# Patient Record
Sex: MALE | Race: OTHER | Employment: UNEMPLOYED | URBAN - METROPOLITAN AREA
[De-identification: names, ages, dates, MRNs, and addresses within clinical notes are randomized per-mention and may not be internally consistent; named-entity substitution may affect disease eponyms.]

---

## 2020-09-16 ENCOUNTER — LAB ENCOUNTER (OUTPATIENT)
Dept: LAB | Age: 55
End: 2020-09-16
Attending: FAMILY MEDICINE
Payer: MEDICAID

## 2020-09-16 ENCOUNTER — OFFICE VISIT (OUTPATIENT)
Dept: FAMILY MEDICINE CLINIC | Facility: CLINIC | Age: 55
End: 2020-09-16
Payer: MEDICAID

## 2020-09-16 VITALS
SYSTOLIC BLOOD PRESSURE: 166 MMHG | BODY MASS INDEX: 26.63 KG/M2 | DIASTOLIC BLOOD PRESSURE: 82 MMHG | HEART RATE: 66 BPM | HEIGHT: 71 IN | TEMPERATURE: 98 F | WEIGHT: 190.19 LBS

## 2020-09-16 DIAGNOSIS — E78.00 PURE HYPERCHOLESTEROLEMIA: ICD-10-CM

## 2020-09-16 DIAGNOSIS — I10 ESSENTIAL HYPERTENSION: ICD-10-CM

## 2020-09-16 DIAGNOSIS — E11.65 UNCONTROLLED TYPE 2 DIABETES MELLITUS WITH HYPERGLYCEMIA (HCC): ICD-10-CM

## 2020-09-16 DIAGNOSIS — E11.65 UNCONTROLLED TYPE 2 DIABETES MELLITUS WITH HYPERGLYCEMIA (HCC): Primary | ICD-10-CM

## 2020-09-16 LAB
ALBUMIN SERPL-MCNC: 3.6 G/DL (ref 3.4–5)
ALBUMIN/GLOB SERPL: 1 {RATIO} (ref 1–2)
ALP LIVER SERPL-CCNC: 87 U/L (ref 45–117)
ALT SERPL-CCNC: 22 U/L (ref 16–61)
ANION GAP SERPL CALC-SCNC: 5 MMOL/L (ref 0–18)
AST SERPL-CCNC: 18 U/L (ref 15–37)
BILIRUB SERPL-MCNC: 0.7 MG/DL (ref 0.1–2)
BUN BLD-MCNC: 16 MG/DL (ref 7–18)
BUN/CREAT SERPL: 16.2 (ref 10–20)
CALCIUM BLD-MCNC: 9.8 MG/DL (ref 8.5–10.1)
CHLORIDE SERPL-SCNC: 105 MMOL/L (ref 98–112)
CHOLEST SMN-MCNC: 190 MG/DL (ref ?–200)
CO2 SERPL-SCNC: 30 MMOL/L (ref 21–32)
CREAT BLD-MCNC: 0.99 MG/DL (ref 0.7–1.3)
CREAT UR-SCNC: 230 MG/DL
EST. AVERAGE GLUCOSE BLD GHB EST-MCNC: 206 MG/DL (ref 68–126)
GLOBULIN PLAS-MCNC: 3.7 G/DL (ref 2.8–4.4)
GLUCOSE BLD-MCNC: 189 MG/DL (ref 70–99)
HBA1C MFR BLD HPLC: 8.8 % (ref ?–5.7)
HDLC SERPL-MCNC: 41 MG/DL (ref 40–59)
LDLC SERPL CALC-MCNC: 102 MG/DL (ref ?–100)
M PROTEIN MFR SERPL ELPH: 7.3 G/DL (ref 6.4–8.2)
MICROALBUMIN UR-MCNC: 77.8 MG/DL
MICROALBUMIN/CREAT 24H UR-RTO: 338.3 UG/MG (ref ?–30)
NONHDLC SERPL-MCNC: 149 MG/DL (ref ?–130)
OSMOLALITY SERPL CALC.SUM OF ELEC: 296 MOSM/KG (ref 275–295)
PATIENT FASTING Y/N/NP: YES
PATIENT FASTING Y/N/NP: YES
POTASSIUM SERPL-SCNC: 4.6 MMOL/L (ref 3.5–5.1)
SODIUM SERPL-SCNC: 140 MMOL/L (ref 136–145)
TRIGL SERPL-MCNC: 235 MG/DL (ref 30–149)
VLDLC SERPL CALC-MCNC: 47 MG/DL (ref 0–30)

## 2020-09-16 PROCEDURE — 83036 HEMOGLOBIN GLYCOSYLATED A1C: CPT

## 2020-09-16 PROCEDURE — 80061 LIPID PANEL: CPT

## 2020-09-16 PROCEDURE — 90471 IMMUNIZATION ADMIN: CPT | Performed by: FAMILY MEDICINE

## 2020-09-16 PROCEDURE — 3008F BODY MASS INDEX DOCD: CPT | Performed by: FAMILY MEDICINE

## 2020-09-16 PROCEDURE — 90686 IIV4 VACC NO PRSV 0.5 ML IM: CPT | Performed by: FAMILY MEDICINE

## 2020-09-16 PROCEDURE — 3079F DIAST BP 80-89 MM HG: CPT | Performed by: FAMILY MEDICINE

## 2020-09-16 PROCEDURE — 3077F SYST BP >= 140 MM HG: CPT | Performed by: FAMILY MEDICINE

## 2020-09-16 PROCEDURE — 99204 OFFICE O/P NEW MOD 45 MIN: CPT | Performed by: FAMILY MEDICINE

## 2020-09-16 PROCEDURE — 80053 COMPREHEN METABOLIC PANEL: CPT

## 2020-09-16 PROCEDURE — 36415 COLL VENOUS BLD VENIPUNCTURE: CPT

## 2020-09-16 PROCEDURE — 82570 ASSAY OF URINE CREATININE: CPT

## 2020-09-16 PROCEDURE — 82043 UR ALBUMIN QUANTITATIVE: CPT

## 2020-09-16 RX ORDER — ATORVASTATIN CALCIUM 40 MG/1
40 TABLET, FILM COATED ORAL DAILY
Qty: 90 TABLET | Refills: 1 | Status: SHIPPED | OUTPATIENT
Start: 2020-09-16 | End: 2020-12-05

## 2020-09-16 RX ORDER — PIOGLITAZONEHYDROCHLORIDE 30 MG/1
30 TABLET ORAL DAILY
Qty: 90 TABLET | Refills: 1 | Status: SHIPPED | OUTPATIENT
Start: 2020-09-16 | End: 2020-12-05

## 2020-09-16 RX ORDER — ERGOCALCIFEROL 1.25 MG/1
CAPSULE ORAL
COMMUNITY
Start: 2018-07-13 | End: 2021-03-08

## 2020-09-16 RX ORDER — LISINOPRIL AND HYDROCHLOROTHIAZIDE 25; 20 MG/1; MG/1
TABLET ORAL
Qty: 90 TABLET | Refills: 1 | Status: SHIPPED | OUTPATIENT
Start: 2020-09-16 | End: 2020-10-27

## 2020-09-16 RX ORDER — LISINOPRIL AND HYDROCHLOROTHIAZIDE 25; 20 MG/1; MG/1
TABLET ORAL
COMMUNITY
Start: 2020-06-01 | End: 2020-09-16

## 2020-09-16 RX ORDER — PIOGLITAZONEHYDROCHLORIDE 30 MG/1
30 TABLET ORAL DAILY
COMMUNITY
Start: 2020-06-01 | End: 2020-09-16

## 2020-09-16 RX ORDER — GLYBURIDE-METFORMIN HYDROCHLORIDE 5; 500 MG/1; MG/1
2 TABLET ORAL 2 TIMES DAILY
Qty: 90 TABLET | Refills: 1 | Status: SHIPPED | OUTPATIENT
Start: 2020-09-16 | End: 2020-09-21

## 2020-09-16 RX ORDER — ATORVASTATIN CALCIUM 40 MG/1
40 TABLET, FILM COATED ORAL DAILY
COMMUNITY
Start: 2020-06-01 | End: 2020-09-16

## 2020-09-16 RX ORDER — LINAGLIPTIN 5 MG/1
5 TABLET, FILM COATED ORAL DAILY
COMMUNITY
Start: 2020-06-07 | End: 2020-09-16

## 2020-09-16 RX ORDER — GLYBURIDE-METFORMIN HYDROCHLORIDE 5; 500 MG/1; MG/1
2 TABLET ORAL 2 TIMES DAILY
COMMUNITY
Start: 2020-06-01 | End: 2020-09-16

## 2020-09-16 NOTE — PROGRESS NOTES
9/16/2020  10:29 AM    Dexter Nicole is a 63year old male. Chief complaint(s): Patient presents with:  Diabetes  HTN  Hyperlipidemia    HPI:     Dexter Nicole primary complaint is regarding as above.      Patient Dexter Nicole is a 63 (medication) and a low cholesterol/low fat diet. Compliance with treatment has been fair. he denies experiencing any hypercholesterolemia related symptoms. Average lipid levels with medication run slightly Low per patient report.   Most recent lab tests Constitutional: Negative for chills, fatigue and fever. Eyes: Negative for visual disturbance. Respiratory: Negative for shortness of breath and wheezing. Cardiovascular: Negative for chest pain, palpitations and leg swelling.    Endocrine: Lacy Ocean glyBURIDE-metFORMIN 5-500 MG Oral Tab, Take 2 tablets by mouth 2 (two) times daily. , Disp: 90 tablet, Rfl: 1  •  Canagliflozin (INVOKANA) 100 MG Oral Tab, Invokana 100 mg tablet  TAKE 1 TABLET BY MOUTH EVERY DAY, Disp: 90 tablet, Rfl: 1  •  atorvastatin 40 target of <7% according to ADA and <6.5% according to AACE were discussed.         2. Essential hypertension   HTN     MEDICATIONS:   Requested Prescriptions     Signed Prescriptions Disp Refills   • Pioglitazone HCl 30 MG Oral Tab 90 tablet 1     Sig: Take tablet 1     Sig: Invokana 100 mg tablet   TAKE 1 TABLET BY MOUTH EVERY DAY   • atorvastatin 40 MG Oral Tab 90 tablet 1     Sig: Take 1 tablet (40 mg total) by mouth daily.       LABORATORY & ORDERS: Orders Placed This Encounter      ** Hemoglobin A1C  [E] Eliana Mcdaniel MD

## 2020-09-21 ENCOUNTER — TELEPHONE (OUTPATIENT)
Dept: FAMILY MEDICINE CLINIC | Facility: CLINIC | Age: 55
End: 2020-09-21

## 2020-09-21 RX ORDER — GLYBURIDE-METFORMIN HYDROCHLORIDE 5; 500 MG/1; MG/1
2 TABLET ORAL 2 TIMES DAILY
Qty: 360 TABLET | Refills: 1 | Status: SHIPPED | OUTPATIENT
Start: 2020-09-21 | End: 2020-10-27

## 2020-09-21 NOTE — TELEPHONE ENCOUNTER
Patient is requesting a new script for a 90 day supply of lisinopril-hydrochlorothiazide. Please advise. Lisinopril-hydroCHLOROthiazide 20-25 MG Oral Tab 90 tablet 1 9/16/2020    Sig:   TOME DOS TABLETAS TODOS LOS D AS PARA LA PRESION.      Route:   (

## 2020-09-21 NOTE — TELEPHONE ENCOUNTER
Pharmacy  calling stating  patient requesting 90 day supply of glyBURIDE-metFORMIN 5-500 MG Oral Tab    Dr. Carlitos Parikh:   Please see pended script and sign

## 2020-10-26 NOTE — TELEPHONE ENCOUNTER
Patient stated he needs a refill on      Lisinopril-hydroCHLOROthiazide 20-25 MG Oral Tab 90 tablet 1 9/16/2020    Sig:   TOME DOS BOBBI MAYS LOS D AS PARA LA PRESION.        glyBURIDE-metFORMIN 5-500 MG Oral Tab 360 tablet 1 9/21/2020     Sig - Route:

## 2020-10-27 RX ORDER — GLYBURIDE-METFORMIN HYDROCHLORIDE 5; 500 MG/1; MG/1
2 TABLET ORAL 2 TIMES DAILY
Qty: 360 TABLET | Refills: 1 | Status: SHIPPED | OUTPATIENT
Start: 2020-10-27 | End: 2020-12-05

## 2020-10-27 RX ORDER — LISINOPRIL AND HYDROCHLOROTHIAZIDE 25; 20 MG/1; MG/1
TABLET ORAL
Qty: 90 TABLET | Refills: 1 | Status: SHIPPED | OUTPATIENT
Start: 2020-10-27 | End: 2020-12-05

## 2020-12-05 ENCOUNTER — OFFICE VISIT (OUTPATIENT)
Dept: FAMILY MEDICINE CLINIC | Facility: CLINIC | Age: 55
End: 2020-12-05
Payer: MEDICAID

## 2020-12-05 VITALS
SYSTOLIC BLOOD PRESSURE: 162 MMHG | WEIGHT: 186.13 LBS | RESPIRATION RATE: 18 BRPM | BODY MASS INDEX: 26.06 KG/M2 | HEIGHT: 71 IN | DIASTOLIC BLOOD PRESSURE: 90 MMHG | HEART RATE: 84 BPM

## 2020-12-05 DIAGNOSIS — E11.65 UNCONTROLLED TYPE 2 DIABETES MELLITUS WITH HYPERGLYCEMIA (HCC): Primary | ICD-10-CM

## 2020-12-05 DIAGNOSIS — I10 ESSENTIAL HYPERTENSION: ICD-10-CM

## 2020-12-05 PROCEDURE — 3077F SYST BP >= 140 MM HG: CPT | Performed by: FAMILY MEDICINE

## 2020-12-05 PROCEDURE — 3008F BODY MASS INDEX DOCD: CPT | Performed by: FAMILY MEDICINE

## 2020-12-05 PROCEDURE — 82962 GLUCOSE BLOOD TEST: CPT | Performed by: FAMILY MEDICINE

## 2020-12-05 PROCEDURE — 3080F DIAST BP >= 90 MM HG: CPT | Performed by: FAMILY MEDICINE

## 2020-12-05 PROCEDURE — 36416 COLLJ CAPILLARY BLOOD SPEC: CPT | Performed by: FAMILY MEDICINE

## 2020-12-05 PROCEDURE — 99213 OFFICE O/P EST LOW 20 MIN: CPT | Performed by: FAMILY MEDICINE

## 2020-12-05 RX ORDER — GLYBURIDE-METFORMIN HYDROCHLORIDE 5; 500 MG/1; MG/1
2 TABLET ORAL 2 TIMES DAILY
Qty: 360 TABLET | Refills: 2 | Status: SHIPPED | OUTPATIENT
Start: 2020-12-05 | End: 2021-06-21

## 2020-12-05 RX ORDER — CANAGLIFLOZIN 100 MG/1
TABLET, FILM COATED ORAL
Qty: 90 TABLET | Refills: 2 | Status: SHIPPED | OUTPATIENT
Start: 2020-12-05 | End: 2021-06-21

## 2020-12-05 RX ORDER — ATORVASTATIN CALCIUM 40 MG/1
40 TABLET, FILM COATED ORAL DAILY
Qty: 90 TABLET | Refills: 2 | Status: SHIPPED | OUTPATIENT
Start: 2020-12-05 | End: 2021-06-21

## 2020-12-05 RX ORDER — LISINOPRIL AND HYDROCHLOROTHIAZIDE 25; 20 MG/1; MG/1
TABLET ORAL
Qty: 180 TABLET | Refills: 2 | Status: SHIPPED | OUTPATIENT
Start: 2020-12-05 | End: 2021-11-23

## 2020-12-05 RX ORDER — PIOGLITAZONEHYDROCHLORIDE 30 MG/1
30 TABLET ORAL DAILY
Qty: 90 TABLET | Refills: 2 | Status: SHIPPED | OUTPATIENT
Start: 2020-12-05 | End: 2021-06-21

## 2020-12-05 NOTE — PROGRESS NOTES
12/5/2020  11:58 AM    Ayanna Ambar is a 63year old male. Chief complaint(s): Patient presents with: Follow - Up: Present for diabetic and bp check. Medication Request: Requesting refills on medications.    HTN  HPI:     Ayanna Villalta p Diabetes [facility])    • Essential hypertension    • Hyperlipidemia       History reviewed. No pertinent surgical history.    Family History   Problem Relation Age of Onset   • Diabetes Father    • Cancer Father 76        Bladder cancer   • Diabetes Mother    • Allergies      ROS:   Review of Systems   Constitutional: Negative for chills, fatigue and fever. Respiratory: Negative for shortness of breath and wheezing. Cardiovascular: Negative for chest pain and palpitations.    Endocrine: Negative for polydipsi 30 MG Oral Tab, Take 1 tablet (30 mg total) by mouth daily. , Disp: 90 tablet, Rfl: 2  •  Lisinopril-hydroCHLOROthiazide 20-25 MG Oral Tab, TOME DOS TABLETAS TODOS LOS D AS PARA LA PRESION., Disp: 180 tablet, Rfl: 2  •  ergocalciferol 1.25 MG (34358 UT) [facility] Visit:  Orders Placed This Encounter      POC Finger stick glucose [[id number]]      Meds This Visit:  Requested Prescriptions     Signed Prescriptions Disp Refills   • atorvastatin 40 MG Oral Tab 90 tablet 2     Sig: Take 1 tablet (40 mg total) by mouth daily.

## 2020-12-15 ENCOUNTER — TELEPHONE (OUTPATIENT)
Dept: FAMILY MEDICINE CLINIC | Facility: CLINIC | Age: 55
End: 2020-12-15

## 2020-12-15 DIAGNOSIS — E11.65 UNCONTROLLED TYPE 2 DIABETES MELLITUS WITH HYPERGLYCEMIA (HCC): Primary | ICD-10-CM

## 2020-12-15 NOTE — TELEPHONE ENCOUNTER
Please see pharmacy's message below and advise on quantity of test strips and add'l refills--only frequency of testing entered on 12/05/2020 DME order          Order Questions    Question Answer Comment   Frequency of testing 2 times daily    Dispensable s

## 2020-12-15 NOTE — TELEPHONE ENCOUNTER
Denice from [facility] calling need to know the quantity for the diabetic strips and how many refills she states the order came over December 5      Please advise   [phone]

## 2020-12-16 NOTE — TELEPHONE ENCOUNTER
Dr. Chance Verma also requesting rx for lancets and meter. Please advise. Thanks. See pending order. Please review and advise.

## 2020-12-16 NOTE — TELEPHONE ENCOUNTER
[facility] calling to inform us that they got the strips but there is no script for needles or lancets, please advise

## 2020-12-17 NOTE — TELEPHONE ENCOUNTER
Items sent to DME folder   Order Questions    Question Answer Comment   Frequency of testing 2 times daily    Dispensable supplies: Blood sugar test strips     Lancet devices and lancets     Blood sugar monitoring device/kit     Glucose control solutions f

## 2021-03-08 ENCOUNTER — OFFICE VISIT (OUTPATIENT)
Dept: FAMILY MEDICINE CLINIC | Facility: CLINIC | Age: 56
End: 2021-03-08
Payer: MEDICAID

## 2021-03-08 VITALS
TEMPERATURE: 98 F | DIASTOLIC BLOOD PRESSURE: 78 MMHG | BODY MASS INDEX: 25.78 KG/M2 | WEIGHT: 184.13 LBS | HEIGHT: 71 IN | SYSTOLIC BLOOD PRESSURE: 139 MMHG | HEART RATE: 69 BPM | RESPIRATION RATE: 17 BRPM

## 2021-03-08 DIAGNOSIS — E11.65 UNCONTROLLED TYPE 2 DIABETES MELLITUS WITH HYPERGLYCEMIA (HCC): Primary | ICD-10-CM

## 2021-03-08 DIAGNOSIS — I10 ESSENTIAL HYPERTENSION: ICD-10-CM

## 2021-03-08 LAB
GLUCOSE BLOOD: 135
TEST STRIP LOT #: NORMAL NUMERIC

## 2021-03-08 PROCEDURE — 3008F BODY MASS INDEX DOCD: CPT | Performed by: FAMILY MEDICINE

## 2021-03-08 PROCEDURE — 82962 GLUCOSE BLOOD TEST: CPT | Performed by: FAMILY MEDICINE

## 2021-03-08 PROCEDURE — 3075F SYST BP GE 130 - 139MM HG: CPT | Performed by: FAMILY MEDICINE

## 2021-03-08 PROCEDURE — 36416 COLLJ CAPILLARY BLOOD SPEC: CPT | Performed by: FAMILY MEDICINE

## 2021-03-08 PROCEDURE — 3078F DIAST BP <80 MM HG: CPT | Performed by: FAMILY MEDICINE

## 2021-03-08 PROCEDURE — 99213 OFFICE O/P EST LOW 20 MIN: CPT | Performed by: FAMILY MEDICINE

## 2021-03-08 RX ORDER — ERGOCALCIFEROL 1.25 MG/1
CAPSULE ORAL
Qty: 12 CAPSULE | Refills: 0 | Status: SHIPPED | OUTPATIENT
Start: 2021-03-08 | End: 2021-06-21

## 2021-03-08 NOTE — PROGRESS NOTES
3/8/2021  10:40 AM    Sara Murphy is a 61year old male. Chief complaint(s): Patient presents with: Follow - Up: DM and BP.     HPI:     Sara Murphy primary complaint is regarding as above.      Patient Bobby Meneses is a 61 year o pertinent surgical history.    Family History   Problem Relation Age of Onset   • Diabetes Father    • Cancer Father 76        Bladder cancer   • Diabetes Mother    • Diabetes Sister    • Diabetes Brother       Social History: Social History    Tobacco Use Allergies      ROS:   Review of Systems   Constitutional: Negative for chills, fatigue and fever. Eyes: Negative for visual disturbance. Respiratory: Negative for shortness of breath and wheezing.     Cardiovascular: Negative for chest pain and palpitat 5-500 MG Oral Tab, Take 2 tablets by mouth 2 (two) times daily. , Disp: 360 tablet, Rfl: 2  •  Pioglitazone HCl 30 MG Oral Tab, Take 1 tablet (30 mg total) by mouth daily. , Disp: 90 tablet, Rfl: 2  •  Lisinopril-hydroCHLOROthiazide 20-25 MG Oral Tab, KAZ CANALES stress reduction. FOLLOW-UP: Schedule a follow-up visit in 6 months.          Orders This Visit:  Orders Placed This Encounter      POC Finger stick glucose [[id number]]      Meds This Visit:  Requested Prescriptions      No prescriptions requested or ordered

## 2021-03-08 NOTE — TELEPHONE ENCOUNTER
Current Outpatient Medications:      •  ergocalciferol 1.25 MG (94176 UT) Oral Cap, TAKE 1 CAPSULE ONCE MONTHLY (Patient not taking: Reported on 3/8/2021), Disp: , Rfl:

## 2021-06-21 ENCOUNTER — TELEPHONE (OUTPATIENT)
Dept: FAMILY MEDICINE CLINIC | Facility: CLINIC | Age: 56
End: 2021-06-21

## 2021-06-21 ENCOUNTER — LAB ENCOUNTER (OUTPATIENT)
Dept: LAB | Age: 56
End: 2021-06-21
Attending: FAMILY MEDICINE
Payer: MEDICAID

## 2021-06-21 ENCOUNTER — OFFICE VISIT (OUTPATIENT)
Dept: FAMILY MEDICINE CLINIC | Facility: CLINIC | Age: 56
End: 2021-06-21
Payer: MEDICAID

## 2021-06-21 VITALS
SYSTOLIC BLOOD PRESSURE: 145 MMHG | HEIGHT: 71 IN | WEIGHT: 184.81 LBS | DIASTOLIC BLOOD PRESSURE: 82 MMHG | HEART RATE: 64 BPM | BODY MASS INDEX: 25.87 KG/M2

## 2021-06-21 DIAGNOSIS — I10 ESSENTIAL HYPERTENSION: ICD-10-CM

## 2021-06-21 DIAGNOSIS — E78.00 PURE HYPERCHOLESTEROLEMIA: ICD-10-CM

## 2021-06-21 DIAGNOSIS — E11.65 UNCONTROLLED TYPE 2 DIABETES MELLITUS WITH HYPERGLYCEMIA (HCC): Primary | ICD-10-CM

## 2021-06-21 DIAGNOSIS — E11.65 UNCONTROLLED TYPE 2 DIABETES MELLITUS WITH HYPERGLYCEMIA (HCC): ICD-10-CM

## 2021-06-21 PROCEDURE — 99214 OFFICE O/P EST MOD 30 MIN: CPT | Performed by: FAMILY MEDICINE

## 2021-06-21 PROCEDURE — 82043 UR ALBUMIN QUANTITATIVE: CPT

## 2021-06-21 PROCEDURE — 80053 COMPREHEN METABOLIC PANEL: CPT

## 2021-06-21 PROCEDURE — 36415 COLL VENOUS BLD VENIPUNCTURE: CPT | Performed by: FAMILY MEDICINE

## 2021-06-21 PROCEDURE — 3077F SYST BP >= 140 MM HG: CPT | Performed by: FAMILY MEDICINE

## 2021-06-21 PROCEDURE — 3008F BODY MASS INDEX DOCD: CPT | Performed by: FAMILY MEDICINE

## 2021-06-21 PROCEDURE — 82570 ASSAY OF URINE CREATININE: CPT

## 2021-06-21 PROCEDURE — 36415 COLL VENOUS BLD VENIPUNCTURE: CPT

## 2021-06-21 PROCEDURE — 3061F NEG MICROALBUMINURIA REV: CPT | Performed by: FAMILY MEDICINE

## 2021-06-21 PROCEDURE — 83036 HEMOGLOBIN GLYCOSYLATED A1C: CPT | Performed by: FAMILY MEDICINE

## 2021-06-21 PROCEDURE — 3060F POS MICROALBUMINURIA REV: CPT | Performed by: FAMILY MEDICINE

## 2021-06-21 PROCEDURE — 3079F DIAST BP 80-89 MM HG: CPT | Performed by: FAMILY MEDICINE

## 2021-06-21 RX ORDER — BLOOD SUGAR DIAGNOSTIC
1 STRIP MISCELLANEOUS 2 TIMES DAILY
COMMUNITY
Start: 2021-04-10

## 2021-06-21 RX ORDER — LANCETS 33 GAUGE
1 EACH MISCELLANEOUS 2 TIMES DAILY
COMMUNITY
Start: 2021-04-10 | End: 2021-07-12

## 2021-06-21 RX ORDER — PIOGLITAZONEHYDROCHLORIDE 30 MG/1
30 TABLET ORAL DAILY
Qty: 90 TABLET | Refills: 2 | Status: SHIPPED | OUTPATIENT
Start: 2021-06-21 | End: 2021-11-23

## 2021-06-21 RX ORDER — HYDROCHLOROTHIAZIDE 25 MG/1
25 TABLET ORAL DAILY
Qty: 90 TABLET | Refills: 1 | Status: SHIPPED | OUTPATIENT
Start: 2021-06-21 | End: 2021-11-23

## 2021-06-21 RX ORDER — ATORVASTATIN CALCIUM 40 MG/1
40 TABLET, FILM COATED ORAL DAILY
Qty: 90 TABLET | Refills: 2 | Status: SHIPPED | OUTPATIENT
Start: 2021-06-21 | End: 2021-11-23

## 2021-06-21 RX ORDER — GLYBURIDE-METFORMIN HYDROCHLORIDE 5; 500 MG/1; MG/1
2 TABLET ORAL 2 TIMES DAILY
Qty: 360 TABLET | Refills: 2 | Status: SHIPPED | OUTPATIENT
Start: 2021-06-21

## 2021-06-21 RX ORDER — ERGOCALCIFEROL 1.25 MG/1
CAPSULE ORAL
Qty: 12 CAPSULE | Refills: 0 | Status: SHIPPED | OUTPATIENT
Start: 2021-06-21 | End: 2021-10-17

## 2021-06-21 RX ORDER — CANAGLIFLOZIN 100 MG/1
TABLET, FILM COATED ORAL
Qty: 90 TABLET | Refills: 2 | Status: SHIPPED | OUTPATIENT
Start: 2021-06-21 | End: 2021-06-21

## 2021-06-21 RX ORDER — CANAGLIFLOZIN 300 MG/1
300 TABLET, FILM COATED ORAL
Qty: 30 TABLET | Refills: 0 | Status: SHIPPED | OUTPATIENT
Start: 2021-06-21 | End: 2021-08-07

## 2021-06-21 RX ORDER — CANAGLIFLOZIN 300 MG/1
300 TABLET, FILM COATED ORAL
Qty: 30 TABLET | Refills: 0 | Status: SHIPPED | OUTPATIENT
Start: 2021-06-21 | End: 2021-06-21

## 2021-06-21 RX ORDER — LISINOPRIL 40 MG/1
40 TABLET ORAL DAILY
Qty: 90 TABLET | Refills: 1 | Status: SHIPPED | OUTPATIENT
Start: 2021-06-21 | End: 2021-11-23

## 2021-06-21 NOTE — TELEPHONE ENCOUNTER
Pharmacy called regarding medications below, They received 2 different prescriptions they want to know which one patient is suppose to receive for Invokana. Please call back.      Medication Detail    Medication Quantity Refills Start End   Canagliflozin (I

## 2021-06-21 NOTE — PROGRESS NOTES
6/21/2021  10:47 AM    Melissa Hernández is a 61year old male. Chief complaint(s): Patient presents with:  Diabetes    HPI:     Melissa Hernández primary complaint is regarding multiple complaints.      Patient Bobby Meneses is a 61year old m history on file.    Family History   Problem Relation Age of Onset   • Diabetes Father    • Cancer Father 76        Bladder cancer   • Diabetes Mother    • Diabetes Sister    • Diabetes Brother       Social History: Social History    Tobacco Use      Smokin 90 tablet 2   • Canagliflozin (INVOKANA) 300 MG Oral Tab Take 300 mg by mouth every morning before breakfast. 30 tablet 0   • lisinopril 40 MG Oral Tab Take 1 tablet (40 mg total) by mouth daily.  90 tablet 1   • hydrochlorothiazide 25 MG Oral Tab Take 1 ta RESULTS:   No results found for: [employer] [place]   Results for orders placed or performed in visit on 06/21/21   HEMOGLOBIN A1C   Result Value Ref Range    HEMOGLOBIN A1C 8.3 (A) 4.3 - 5.6 %    Cartridge Lot# [id number] Num (25 mg total) by mouth daily. , Disp: 90 tablet, Rfl: 1  •  Lisinopril-hydroCHLOROthiazide 20-25 MG Oral Tab, TOME DOS TABLETAS TODOS LOS D AS PARA LA PRESION., Disp: 180 tablet, Rfl: 2.   Requested Prescriptions     Signed Prescriptions Disp Refills   • epi (<140-160 mg/dl) Home glucose testing discussed. The A1c target of <7% according to ADA and <6.5% according to AACE were discussed.         2. Pure hypercholesterolemia     Hyperlipidemia     MEDICATIONS:   Requested Prescriptions     Signed Prescriptions D hydrochlorothiazide 25 MG Oral Tab 90 tablet 1     Sig: Take 1 tablet (25 mg total) by mouth daily.       LABORATORY & ORDERS: Orders Placed This Encounter      POC Glycohemoglobin [[id number]]      Microalb/Creat Ratio, Random Urine      Comp Metabolic Panel (1

## 2021-07-12 RX ORDER — LANCETS 33 GAUGE
EACH MISCELLANEOUS
Qty: 200 EACH | Refills: 0 | Status: SHIPPED | OUTPATIENT
Start: 2021-07-12

## 2021-08-07 RX ORDER — CANAGLIFLOZIN 300 MG/1
TABLET, FILM COATED ORAL
Qty: 30 TABLET | Refills: 0 | Status: CANCELLED | OUTPATIENT
Start: 2021-08-07

## 2021-08-08 RX ORDER — CANAGLIFLOZIN 300 MG/1
300 TABLET, FILM COATED ORAL
Qty: 90 TABLET | Refills: 1 | Status: SHIPPED | OUTPATIENT
Start: 2021-08-08 | End: 2021-11-23

## 2021-08-08 NOTE — TELEPHONE ENCOUNTER
Please review; protocol failed/no protocol.      Requested Prescriptions   Pending Prescriptions Disp Refills    INVOKANA 300 MG Oral Tab [Pharmacy Med Name: INVOKANA 300MG TABLETS] 30 tablet 0     Sig: TAKE 1 TABLET BY MOUTH EVERY MORNING BEFORE BREAKFAST

## 2021-10-17 RX ORDER — ERGOCALCIFEROL 1.25 MG/1
CAPSULE ORAL
Qty: 12 CAPSULE | Refills: 0 | Status: SHIPPED | OUTPATIENT
Start: 2021-10-17

## 2021-11-23 ENCOUNTER — OFFICE VISIT (OUTPATIENT)
Dept: FAMILY MEDICINE CLINIC | Facility: CLINIC | Age: 56
End: 2021-11-23
Payer: MEDICAID

## 2021-11-23 VITALS
HEART RATE: 59 BPM | BODY MASS INDEX: 25.06 KG/M2 | WEIGHT: 179 LBS | DIASTOLIC BLOOD PRESSURE: 83 MMHG | SYSTOLIC BLOOD PRESSURE: 176 MMHG | HEIGHT: 71 IN

## 2021-11-23 DIAGNOSIS — I10 ESSENTIAL HYPERTENSION: ICD-10-CM

## 2021-11-23 DIAGNOSIS — E11.65 UNCONTROLLED TYPE 2 DIABETES MELLITUS WITH HYPERGLYCEMIA (HCC): Primary | ICD-10-CM

## 2021-11-23 PROCEDURE — 83036 HEMOGLOBIN GLYCOSYLATED A1C: CPT | Performed by: FAMILY MEDICINE

## 2021-11-23 PROCEDURE — 99214 OFFICE O/P EST MOD 30 MIN: CPT | Performed by: FAMILY MEDICINE

## 2021-11-23 PROCEDURE — 3079F DIAST BP 80-89 MM HG: CPT | Performed by: FAMILY MEDICINE

## 2021-11-23 PROCEDURE — 3008F BODY MASS INDEX DOCD: CPT | Performed by: FAMILY MEDICINE

## 2021-11-23 PROCEDURE — 36415 COLL VENOUS BLD VENIPUNCTURE: CPT | Performed by: FAMILY MEDICINE

## 2021-11-23 PROCEDURE — 3077F SYST BP >= 140 MM HG: CPT | Performed by: FAMILY MEDICINE

## 2021-11-23 RX ORDER — CANAGLIFLOZIN 300 MG/1
300 TABLET, FILM COATED ORAL
Qty: 90 TABLET | Refills: 1 | Status: SHIPPED | OUTPATIENT
Start: 2021-11-23

## 2021-11-23 RX ORDER — ATORVASTATIN CALCIUM 40 MG/1
40 TABLET, FILM COATED ORAL DAILY
Qty: 90 TABLET | Refills: 2 | Status: SHIPPED | OUTPATIENT
Start: 2021-11-23

## 2021-11-23 RX ORDER — HYDROCHLOROTHIAZIDE 25 MG/1
25 TABLET ORAL DAILY
Qty: 90 TABLET | Refills: 1 | Status: SHIPPED | OUTPATIENT
Start: 2021-11-23

## 2021-11-23 RX ORDER — LISINOPRIL 40 MG/1
40 TABLET ORAL DAILY
Qty: 90 TABLET | Refills: 1 | Status: SHIPPED | OUTPATIENT
Start: 2021-11-23

## 2021-11-23 RX ORDER — PIOGLITAZONEHYDROCHLORIDE 30 MG/1
30 TABLET ORAL DAILY
Qty: 90 TABLET | Refills: 2 | Status: SHIPPED | OUTPATIENT
Start: 2021-11-23

## 2021-11-23 NOTE — PROGRESS NOTES
11/23/2021  10:18 AM    Miller Mao is a 61year old male. Chief complaint(s): Patient presents with:  HTN  Diabetes    HPI:     Miller Mao primary complaint is regarding Diabetes.      Patient Bobby Meneses is a 61year old male i history on file.    Family History   Problem Relation Age of Onset   • Diabetes Father    • Cancer Father 76        Bladder cancer   • Diabetes Mother    • Diabetes Sister    • Diabetes Brother       Social History: Social History    Tobacco Use      Smokin tablet 2   • ergocalciferol 1.25 MG (92042 UT) Oral Cap TAKE ONE CAPSULE BY MOUTH ONCE A MONTH 12 capsule 0   • ONETOUCH DELICA PLUS [id number] Does not apply Misc TEST BLOOD SUGAR TWICE DAILY 200 each 0   • Glucose Blood (ONETOUCH VERIO) In Vitro Strip 1 s [[id number]]    Additional orders include:   MEDICATIONS:  •  Canagliflozin (INVOKANA) 300 MG Oral Tab, Take 300 mg by mouth before breakfast., Disp: 90 tablet, Rfl: 1  •  lisinopril 40 MG Oral Tab, Take 1 tablet (40 mg total) by mouth daily. , Disp: 90 tablet, cakes, ice cream, etc.      FOLLOW-UP: Schedule a follow-up visit in 4 months .        COUNSELING: The patient was counseled concerning the relationship between diabetes control and macrovascular disease including cardiovascular, cerebrovascular and periphe Prescriptions Disp Refills   • Canagliflozin (INVOKANA) 300 MG Oral Tab 90 tablet 1     Sig: Take 300 mg by mouth before breakfast.   • lisinopril 40 MG Oral Tab 90 tablet 1     Sig: Take 1 tablet (40 mg total) by mouth daily.    • hydroCHLOROthiazide 25 MG

## 2022-01-10 ENCOUNTER — HOSPITAL ENCOUNTER (OUTPATIENT)
Dept: GENERAL RADIOLOGY | Age: 57
Discharge: HOME | End: 2022-01-10
Attending: FAMILY MEDICINE
Payer: MEDICAID

## 2022-01-10 ENCOUNTER — OFFICE VISIT (OUTPATIENT)
Dept: FAMILY MEDICINE CLINIC | Facility: CLINIC | Age: 57
End: 2022-01-10
Payer: MEDICAID

## 2022-01-10 VITALS
HEART RATE: 60 BPM | WEIGHT: 184 LBS | HEIGHT: 71 IN | BODY MASS INDEX: 25.76 KG/M2 | DIASTOLIC BLOOD PRESSURE: 90 MMHG | SYSTOLIC BLOOD PRESSURE: 160 MMHG

## 2022-01-10 DIAGNOSIS — Z12.11 COLON CANCER SCREENING: ICD-10-CM

## 2022-01-10 DIAGNOSIS — M19.011 PRIMARY OSTEOARTHRITIS OF RIGHT SHOULDER: Primary | ICD-10-CM

## 2022-01-10 DIAGNOSIS — M17.11 PRIMARY OSTEOARTHRITIS OF RIGHT KNEE: ICD-10-CM

## 2022-01-10 PROCEDURE — 99213 OFFICE O/P EST LOW 20 MIN: CPT | Performed by: FAMILY MEDICINE

## 2022-01-10 PROCEDURE — 3008F BODY MASS INDEX DOCD: CPT | Performed by: FAMILY MEDICINE

## 2022-01-10 PROCEDURE — 3077F SYST BP >= 140 MM HG: CPT | Performed by: FAMILY MEDICINE

## 2022-01-10 PROCEDURE — 73030 X-RAY EXAM OF SHOULDER: CPT | Performed by: FAMILY MEDICINE

## 2022-01-10 PROCEDURE — 90686 IIV4 VACC NO PRSV 0.5 ML IM: CPT | Performed by: FAMILY MEDICINE

## 2022-01-10 PROCEDURE — 90471 IMMUNIZATION ADMIN: CPT | Performed by: FAMILY MEDICINE

## 2022-01-10 PROCEDURE — 3080F DIAST BP >= 90 MM HG: CPT | Performed by: FAMILY MEDICINE

## 2022-01-10 NOTE — PROGRESS NOTES
1/10/2022  10:35 AM    Stephanie Long is a 61year old male. Chief complaint(s): Patient presents with:  Shoulder Pain: right shoulder pain has been for 2 months now    HPI:     Stephanie Long primary complaint is regarding multiple issues. Medicare ()                          08/07/2012      HIGH DOSE FLU 65 YRS AND OLDER PRSV FREE SINGLE D (57208) FLU CLINIC                          09/22/2019      Influenza             10/26/2015  11/30/2016      Influenza Virus Vaccine, H1N1 Negative for myalgias. Right shoulder pain   Skin: Negative for rash. Neurological: Negative for dizziness, weakness and headaches.        PHYSICAL EXAM:   VS: /90   Pulse 60   Ht 5' 11\" (1.803 m)   Wt 184 lb (83.5 kg)   BMI 25.66 kg/m² [address] [place] [address] Simran if there is a deterioration or worsening of the medical condition. Also, inform the doctor with any new symptoms or medications' side effects. ROM exercise at home. FOLLOW-UP: Schedule a follow-up visit in  KPA/ PRN.            Orders T

## 2022-01-24 ENCOUNTER — OFFICE VISIT (OUTPATIENT)
Dept: FAMILY MEDICINE CLINIC | Facility: CLINIC | Age: 57
End: 2022-01-24
Payer: MEDICAID

## 2022-01-24 VITALS
HEART RATE: 80 BPM | HEIGHT: 71 IN | DIASTOLIC BLOOD PRESSURE: 80 MMHG | SYSTOLIC BLOOD PRESSURE: 168 MMHG | WEIGHT: 181.38 LBS | BODY MASS INDEX: 25.39 KG/M2

## 2022-01-24 DIAGNOSIS — R42 VERTIGO: Primary | ICD-10-CM

## 2022-01-24 DIAGNOSIS — I10 ESSENTIAL HYPERTENSION: ICD-10-CM

## 2022-01-24 PROCEDURE — 3008F BODY MASS INDEX DOCD: CPT | Performed by: FAMILY MEDICINE

## 2022-01-24 PROCEDURE — 3079F DIAST BP 80-89 MM HG: CPT | Performed by: FAMILY MEDICINE

## 2022-01-24 PROCEDURE — 3077F SYST BP >= 140 MM HG: CPT | Performed by: FAMILY MEDICINE

## 2022-01-24 PROCEDURE — 99213 OFFICE O/P EST LOW 20 MIN: CPT | Performed by: FAMILY MEDICINE

## 2022-01-24 RX ORDER — MECLIZINE HCL 12.5 MG/1
12.5 TABLET ORAL 3 TIMES DAILY PRN
Qty: 40 TABLET | Refills: 1 | Status: SHIPPED | OUTPATIENT
Start: 2022-01-24 | End: 2022-02-07

## 2022-01-24 RX ORDER — AMLODIPINE BESYLATE 10 MG/1
10 TABLET ORAL DAILY
Qty: 30 TABLET | Refills: 3 | Status: SHIPPED | OUTPATIENT
Start: 2022-01-24

## 2022-01-24 NOTE — PROGRESS NOTES
1/24/2022  1:33 PM    Liberty Frankel is a 61year old male. Chief complaint(s): Patient presents with:  Dizziness: x 4 days, mostly in upon awakening    HPI:     Liberty Frankel primary complaint is regarding dizzines.      Liberty Frankel i older PFS 0.5 ml (12581)                          11/02/2017 12/12/2018 09/22/2019 09/16/2020      Fluvirin, 3 Years & >, Im                          10/17/2014      Fluzone Vaccine Medicare ()                          08/ change, fatigue and fever. HENT: Negative for hearing loss and tinnitus. Respiratory: Negative for shortness of breath. Cardiovascular: Negative for chest pain. Gastrointestinal: Negative for abdominal pain.    Musculoskeletal: Negative for myalgi evidence for acute fracture or dislocation. There is mild narrowing of the inferior glenohumeral joint with tiny marginal osteophytes.   Mild narrowing and early osteophyte formation at the inferior [facility] as well without significance osteophyte extensio Oral Tab EC, Take 1 tablet (50 mg total) by mouth 2 (two) times daily.  (Patient not taking: Reported on 1/24/2022), Disp: 60 tablet, Rfl: 1         Refills Given today:    Requested Prescriptions     Signed Prescriptions Disp Refills   • amLODIPine 10 MG O

## 2022-02-07 ENCOUNTER — OFFICE VISIT (OUTPATIENT)
Dept: FAMILY MEDICINE CLINIC | Facility: CLINIC | Age: 57
End: 2022-02-07
Payer: MEDICAID

## 2022-02-07 VITALS
HEART RATE: 69 BPM | DIASTOLIC BLOOD PRESSURE: 68 MMHG | BODY MASS INDEX: 25.48 KG/M2 | WEIGHT: 182 LBS | HEIGHT: 71 IN | SYSTOLIC BLOOD PRESSURE: 134 MMHG

## 2022-02-07 DIAGNOSIS — I10 ESSENTIAL HYPERTENSION: Primary | ICD-10-CM

## 2022-02-07 DIAGNOSIS — R42 VERTIGO: ICD-10-CM

## 2022-02-07 DIAGNOSIS — E11.65 UNCONTROLLED TYPE 2 DIABETES MELLITUS WITH HYPERGLYCEMIA (HCC): ICD-10-CM

## 2022-02-07 LAB
GLUCOSE BLOOD: 82
TEST STRIP LOT #: NORMAL NUMERIC

## 2022-02-07 PROCEDURE — 3008F BODY MASS INDEX DOCD: CPT | Performed by: FAMILY MEDICINE

## 2022-02-07 PROCEDURE — 82962 GLUCOSE BLOOD TEST: CPT | Performed by: FAMILY MEDICINE

## 2022-02-07 PROCEDURE — 3078F DIAST BP <80 MM HG: CPT | Performed by: FAMILY MEDICINE

## 2022-02-07 PROCEDURE — 99213 OFFICE O/P EST LOW 20 MIN: CPT | Performed by: FAMILY MEDICINE

## 2022-02-07 PROCEDURE — 3075F SYST BP GE 130 - 139MM HG: CPT | Performed by: FAMILY MEDICINE

## 2022-02-07 RX ORDER — MECLIZINE HCL 12.5 MG/1
12.5 TABLET ORAL 3 TIMES DAILY PRN
Qty: 60 TABLET | Refills: 1 | Status: SHIPPED | OUTPATIENT
Start: 2022-02-07

## 2022-02-09 RX ORDER — CANAGLIFLOZIN 300 MG/1
300 TABLET, FILM COATED ORAL
Qty: 90 TABLET | Refills: 1 | Status: SHIPPED | OUTPATIENT
Start: 2022-02-09

## 2022-02-09 NOTE — TELEPHONE ENCOUNTER
Please review. Protocol failed / No protocol.    Requested Prescriptions   Pending Prescriptions Disp Refills    INVOKANA 300 MG Oral Tab [Pharmacy Med Name: Jaswinder Hansen 300MG TABLETS] 90 tablet 1     Sig: TAKE 1 TABLET BY MOUTH BEFORE BREAKFAST        Diabetes Medication Protocol Failed - 2/9/2022 10:41 AM        Failed - Last A1C < 7.5 and within past 6 months     Lab Results   Component Value Date    A1C 7.9 (A) 11/23/2021               Passed - Appointment in past 6 or next 3 months        Passed - GFR Non- > 50     Lab Results   Component Value Date    GFRNAA 93 06/21/2021                 Passed - GFR in the past 12 months                Recent Outpatient Visits              2 days ago Ess[facility], Beata [facility]    Office Visit    2 weeks ago [facility], Mitch Ellis, Nito Carmona MD    Office Visit    1 month ago Primary osteoarthritis of right shoulder    [facility], Beata Barry MD    Office Visit    2 months ago Uncontrolled type 2 diabetes mellitus with hyperglycemia [facility])    [facility]    Office Visit    7 months ago Uncontrolled type 2 diabetes mellitus with hyperglycemia [facility])    [facility], Beata Barry MD    Office Visit

## 2022-02-14 ENCOUNTER — OFFICE VISIT (OUTPATIENT)
Dept: PHYSICAL THERAPY | Age: 57
End: 2022-02-14
Attending: FAMILY MEDICINE
Payer: MEDICAID

## 2022-02-14 PROCEDURE — 97161 PT EVAL LOW COMPLEX 20 MIN: CPT

## 2022-02-17 ENCOUNTER — OFFICE VISIT (OUTPATIENT)
Dept: PHYSICAL THERAPY | Age: 57
End: 2022-02-17
Attending: FAMILY MEDICINE
Payer: MEDICAID

## 2022-02-17 PROCEDURE — 97140 MANUAL THERAPY 1/> REGIONS: CPT | Performed by: PHYSICAL THERAPIST

## 2022-02-17 PROCEDURE — 97110 THERAPEUTIC EXERCISES: CPT | Performed by: PHYSICAL THERAPIST

## 2022-02-17 NOTE — PROGRESS NOTES
Diagnosis: Primary osteoarthritis of right shoulder (M19.011)        Next MD visit: none scheduled  Fall Risk: standard         Precautions: n/a          Medication Changes since last visit?: No      Subjective: Complains of L shoulder pain when moving the arm. Pt describes pain level 8/10. Objective:  Language Line  provided: Gladys # [id number]  (+) capsular pattern of limitations on L shoulder  L shoulder ROM minimally limited into all planes    Assessment: Presents with decreased L shoulder ROM due to pain. Patient and PT goals are in progress. Plan: Continue PT.  Progress rehab program to improve R UE mobility     2/17/2022  Visit#: 2/8 visits until 4/15/2022   Thera Ex   x30min  - supine overhead flexion with edward 10 x 2  - supine B shoulder horizontal abduction 10 x 2  - R shoulder AROM into external rotation 10 x 2  - Manual PT  - sidelying R shoulder external rotation 10 x 2 with 2lbs  - B shoulder extension AROM  - R shoulder stretch into internal rotation  - R shoulder AROM into flexion  - B shoulder extension, rows with blue theraband  - R shoulder stretch into flexion on wall     Manual PT   10min  - R shoulder PROM with inferior glides, sustained and oscillation glides                     Charges: EX2, Manual PT1       Total Timed Treatment: 40 min  Total Treatment Time: 40 min

## 2022-02-22 ENCOUNTER — OFFICE VISIT (OUTPATIENT)
Dept: PHYSICAL THERAPY | Age: 57
End: 2022-02-22
Attending: FAMILY MEDICINE
Payer: MEDICAID

## 2022-02-22 PROCEDURE — 97140 MANUAL THERAPY 1/> REGIONS: CPT | Performed by: PHYSICAL THERAPIST

## 2022-02-22 PROCEDURE — 97110 THERAPEUTIC EXERCISES: CPT | Performed by: PHYSICAL THERAPIST

## 2022-02-22 NOTE — PROGRESS NOTES
Diagnosis: Primary osteoarthritis of right shoulder (M19.011)        Next MD visit: none scheduled  Fall Risk: standard         Precautions: n/a          Medication Changes since last visit?: No      Subjective: Complains of only a little bit of L shoulder pain. Pt describes pain level 3/10. Objective:  Language Line  provided: Gladys # [id number]  (+) capsular pattern of limitations on L shoulder  L shoulder ROM minimally limited into all planes    Assessment: Presents with improved R shoulder ROM. Now WNL into all planes. Patient is independent with his home exercises. Reports 0/10 pain at rest and during activity. Patient and PT goals are partially met      Plan: Continue PT. Anticipate discharge to a home program after 1 more visit or if PT and patient goals are met sooner. 2/22/2022  Visit#: 3/8 visits until 4/15/2022 2/17/2022  Visit#: 2/8 visits until 4/15/2022   Thera Ex   x30min  - R shoulder PROM  - supine forward press with 8lb db's 10 x 2  - supine overhead flexion, horizontal abduction 5lbs  10 x 2  - blue theraband shoulder rows, extensions  - green theraband   - reviewed HEP.  Instructed on additonal HEP  - prone shoulder single rows, extensions with 8lb wt  - prone R shoulder horizontal abduction 10 x 2  - green theraband shoulder external rotation in neutral and in abduction 10 x 2  - R shoulder diagonals on cable row 5-10# x30min  - supine overhead flexion with edward 10 x 2  - supine B shoulder horizontal abduction 10 x 2  - R shoulder AROM into external rotation 10 x 2  - Manual PT  - sidelying R shoulder external rotation 10 x 2 with 2lbs  - B shoulder extension AROM  - R shoulder stretch into internal rotation  - R shoulder AROM into flexion  - B shoulder extension, rows with blue theraband  - R shoulder stretch into flexion on wall     Manual PT   10min  - R shoulder PROM with inferior glides, sustained and oscillation glides 10min  - R shoulder PROM with inferior glides, sustained and oscillation glides                       Charges: EX2, Manual PT1       Total Timed Treatment: 40 min  Total Treatment Time: 40 min

## 2022-02-24 ENCOUNTER — APPOINTMENT (OUTPATIENT)
Dept: PHYSICAL THERAPY | Age: 57
End: 2022-02-24
Attending: FAMILY MEDICINE
Payer: MEDICAID

## 2022-03-01 ENCOUNTER — APPOINTMENT (OUTPATIENT)
Dept: PHYSICAL THERAPY | Age: 57
End: 2022-03-01
Attending: FAMILY MEDICINE
Payer: MEDICAID

## 2022-03-07 ENCOUNTER — APPOINTMENT (OUTPATIENT)
Dept: PHYSICAL THERAPY | Age: 57
End: 2022-03-07
Attending: FAMILY MEDICINE
Payer: MEDICAID

## 2022-03-07 ENCOUNTER — TELEPHONE (OUTPATIENT)
Dept: PHYSICAL THERAPY | Facility: HOSPITAL | Age: 57
End: 2022-03-07

## 2022-03-15 ENCOUNTER — APPOINTMENT (OUTPATIENT)
Dept: PHYSICAL THERAPY | Age: 57
End: 2022-03-15
Attending: FAMILY MEDICINE
Payer: MEDICAID

## 2022-03-17 ENCOUNTER — APPOINTMENT (OUTPATIENT)
Dept: PHYSICAL THERAPY | Age: 57
End: 2022-03-17
Attending: FAMILY MEDICINE
Payer: MEDICAID

## 2022-03-18 ENCOUNTER — OFFICE VISIT (OUTPATIENT)
Dept: FAMILY MEDICINE CLINIC | Facility: CLINIC | Age: 57
End: 2022-03-18
Payer: MEDICAID

## 2022-03-18 VITALS
HEIGHT: 71 IN | HEART RATE: 68 BPM | DIASTOLIC BLOOD PRESSURE: 72 MMHG | SYSTOLIC BLOOD PRESSURE: 121 MMHG | WEIGHT: 183.19 LBS | BODY MASS INDEX: 25.65 KG/M2

## 2022-03-18 DIAGNOSIS — H60.8X3 OTHER NONINFECTIOUS OTITIS EXTERNA OF BOTH EARS, UNSPECIFIED CHRONICITY: Primary | ICD-10-CM

## 2022-03-18 DIAGNOSIS — R42 VERTIGO: ICD-10-CM

## 2022-03-18 PROCEDURE — 3008F BODY MASS INDEX DOCD: CPT | Performed by: FAMILY MEDICINE

## 2022-03-18 PROCEDURE — 99213 OFFICE O/P EST LOW 20 MIN: CPT | Performed by: FAMILY MEDICINE

## 2022-03-18 PROCEDURE — 3078F DIAST BP <80 MM HG: CPT | Performed by: FAMILY MEDICINE

## 2022-03-18 PROCEDURE — 3074F SYST BP LT 130 MM HG: CPT | Performed by: FAMILY MEDICINE

## 2022-03-18 RX ORDER — ACETIC ACID 20.65 MG/ML
4 SOLUTION AURICULAR (OTIC) 3 TIMES DAILY
Qty: 15 ML | Refills: 0 | Status: SHIPPED | OUTPATIENT
Start: 2022-03-18

## 2022-04-25 ENCOUNTER — TELEPHONE (OUTPATIENT)
Dept: INTERNAL MEDICINE CLINIC | Facility: CLINIC | Age: 57
End: 2022-04-25

## 2022-04-25 DIAGNOSIS — Z12.11 SCREENING FOR COLON CANCER: Primary | ICD-10-CM

## 2022-04-25 RX ORDER — POLYETHYLENE GLYCOL 3350, SODIUM SULFATE ANHYDROUS, SODIUM BICARBONATE, SODIUM CHLORIDE, POTASSIUM CHLORIDE 236; 22.74; 6.74; 5.86; 2.97 G/4L; G/4L; G/4L; G/4L; G/4L
4 POWDER, FOR SOLUTION ORAL ONCE
Qty: 1 EACH | Refills: 0 | Status: SHIPPED | OUTPATIENT
Start: 2022-04-25 | End: 2022-04-25

## 2022-04-26 NOTE — TELEPHONE ENCOUNTER
Dr Serge Nicole,    I spoke to the pt this morning and confirmed with him that he is taking all 3 of his diabetic medications:    PIOGLITAZONE  INVOKANA  GLYBURIDE-METFORMIN    He is also taking the Lisinopril    All medications, allergies and pharmacy verified

## 2022-04-29 ENCOUNTER — OFFICE VISIT (OUTPATIENT)
Dept: FAMILY MEDICINE CLINIC | Facility: CLINIC | Age: 57
End: 2022-04-29
Payer: MEDICAID

## 2022-04-29 VITALS
HEIGHT: 71 IN | WEIGHT: 186.81 LBS | BODY MASS INDEX: 26.15 KG/M2 | TEMPERATURE: 98 F | DIASTOLIC BLOOD PRESSURE: 74 MMHG | SYSTOLIC BLOOD PRESSURE: 148 MMHG | HEART RATE: 64 BPM

## 2022-04-29 DIAGNOSIS — H92.01 RIGHT EAR PAIN: Primary | ICD-10-CM

## 2022-04-29 DIAGNOSIS — H93.13 TINNITUS OF BOTH EARS: ICD-10-CM

## 2022-04-29 DIAGNOSIS — B35.1 ONYCHOMYCOSIS OF GREAT TOE: ICD-10-CM

## 2022-04-29 PROCEDURE — 3077F SYST BP >= 140 MM HG: CPT | Performed by: FAMILY MEDICINE

## 2022-04-29 PROCEDURE — 99213 OFFICE O/P EST LOW 20 MIN: CPT | Performed by: FAMILY MEDICINE

## 2022-04-29 PROCEDURE — 3078F DIAST BP <80 MM HG: CPT | Performed by: FAMILY MEDICINE

## 2022-04-29 PROCEDURE — 3008F BODY MASS INDEX DOCD: CPT | Performed by: FAMILY MEDICINE

## 2022-04-29 RX ORDER — BRAN 5 G
1 WAFER ORAL 2 TIMES DAILY
Qty: 180 TABLET | Refills: 1 | Status: SHIPPED | OUTPATIENT
Start: 2022-04-29

## 2022-04-29 RX ORDER — TERBINAFINE HYDROCHLORIDE 250 MG/1
250 TABLET ORAL DAILY
Qty: 30 TABLET | Refills: 2 | Status: SHIPPED | OUTPATIENT
Start: 2022-04-29

## 2022-05-10 NOTE — TELEPHONE ENCOUNTER
CB, unable to LM to schedule procedure, no VM boxes available. Please transfer to Vincent Townsend at ext [phone] for scheduling.

## 2022-05-23 RX ORDER — AMLODIPINE BESYLATE 10 MG/1
10 TABLET ORAL DAILY
Qty: 90 TABLET | Refills: 1 | Status: SHIPPED | OUTPATIENT
Start: 2022-05-23

## 2022-05-23 NOTE — TELEPHONE ENCOUNTER
Refill passed per [place] Ravel Law, [facility] protocol. Requested Prescriptions   Pending Prescriptions Disp Refills    amLODIPine 10 MG Oral Tab 90 tablet 1     Sig: Take 1 tablet (10 mg total) by mouth daily.         Hypertensive Medications Protocol Passed - 5/23/2022 11:30 AM        Passed - CMP or BMP in past 12 months        Passed - Appointment in past 6 or next 3 months        Passed - GFR Non- > 50     Lab Results   Component Value Date    GFRNAA 93 06/21/2021                        Recent Outpatient Visits              3 weeks ago Right ear pain    [facility], [address], Nito Begum MD    Office Visit    2 months ago Other noninfectious otitis externa of both ears, unspecified chronicity    [address], Nito Begum MD    Office Visit    3 months ago     [facility] in [place] [address] Te Rodríguez, PT    Office Visit    3 months ago     [facility] in [place] [address] Te Rodríguez, PT    Office Visit    3 months ago     [facility] in [facility] [address] [facility]    Office Visit

## 2022-06-06 RX ORDER — HYDROCHLOROTHIAZIDE 25 MG/1
25 TABLET ORAL DAILY
Qty: 90 TABLET | Refills: 1 | Status: SHIPPED | OUTPATIENT
Start: 2022-06-06 | End: 2022-12-05

## 2022-06-06 NOTE — TELEPHONE ENCOUNTER
Refill passed per [employer] protocol. Requested Prescriptions   Pending Prescriptions Disp Refills    hydroCHLOROthiazide 25 MG Oral Tab 90 tablet 1     Sig: Take 1 tablet (25 mg total) by mouth daily.         Hypertensive Medications Protocol Passed - 6/6/2022  2:10 PM        Passed - CMP or BMP in past 12 months        Passed - Appointment in past 6 or next 3 months        Passed - GFR Non- > 50     Lab Results   Component Value Date    [facility] 93 06/21/2021                     Future Appointments         Provider Department Appt Notes    In 1 week Dorothea Mike MD [facility], [address], [address], Nito follow up          Recent Outpatient Visits              1 month ago Right ear pain    [address], Nito Mike MD    Office Visit    2 months ago Other noninfectious otitis externa of both ears, unspecified chronicity    [facility] Nito Mike MD    Office Visit    3 months ago     [address] [place] [address] in [place] [address]., Jacob Osorio, PT    Office Visit    3 months ago     [address] [place] [address] in [place] [address] Jacob Osorio, PT    Office Visit    3 months ago     [address] [place] [address] [facility]    Office Visit

## 2022-06-15 ENCOUNTER — OFFICE VISIT (OUTPATIENT)
Dept: FAMILY MEDICINE CLINIC | Facility: CLINIC | Age: 57
End: 2022-06-15
Payer: MEDICAID

## 2022-06-15 VITALS
HEART RATE: 67 BPM | HEIGHT: 71 IN | DIASTOLIC BLOOD PRESSURE: 62 MMHG | SYSTOLIC BLOOD PRESSURE: 117 MMHG | BODY MASS INDEX: 25.99 KG/M2 | WEIGHT: 185.63 LBS

## 2022-06-15 DIAGNOSIS — N52.9 ERECTILE DYSFUNCTION, UNSPECIFIED ERECTILE DYSFUNCTION TYPE: ICD-10-CM

## 2022-06-15 DIAGNOSIS — I10 ESSENTIAL HYPERTENSION: ICD-10-CM

## 2022-06-15 DIAGNOSIS — H93.13 TINNITUS OF BOTH EARS: ICD-10-CM

## 2022-06-15 DIAGNOSIS — E11.9 TYPE 2 DIABETES MELLITUS WITHOUT COMPLICATION, WITHOUT LONG-TERM CURRENT USE OF INSULIN (HCC): Primary | ICD-10-CM

## 2022-06-15 LAB
CARTRIDGE LOT#: ABNORMAL NUMERIC
HEMOGLOBIN A1C: 7.6 % (ref 4.3–5.6)

## 2022-06-15 PROCEDURE — 3074F SYST BP LT 130 MM HG: CPT | Performed by: FAMILY MEDICINE

## 2022-06-15 PROCEDURE — 3051F HG A1C>EQUAL 7.0%<8.0%: CPT | Performed by: FAMILY MEDICINE

## 2022-06-15 PROCEDURE — 3078F DIAST BP <80 MM HG: CPT | Performed by: FAMILY MEDICINE

## 2022-06-15 PROCEDURE — 3008F BODY MASS INDEX DOCD: CPT | Performed by: FAMILY MEDICINE

## 2022-06-15 PROCEDURE — 83036 HEMOGLOBIN GLYCOSYLATED A1C: CPT | Performed by: FAMILY MEDICINE

## 2022-06-15 PROCEDURE — 99214 OFFICE O/P EST MOD 30 MIN: CPT | Performed by: FAMILY MEDICINE

## 2022-06-15 RX ORDER — POLYETHYLENE GLYCOL 3350, SODIUM SULFATE ANHYDROUS, SODIUM BICARBONATE, SODIUM CHLORIDE, POTASSIUM CHLORIDE 236; 22.74; 6.74; 5.86; 2.97 G/4L; G/4L; G/4L; G/4L; G/4L
4000 POWDER, FOR SOLUTION ORAL ONCE
COMMUNITY
Start: 2022-04-25

## 2022-06-15 RX ORDER — SILDENAFIL 100 MG/1
100 TABLET, FILM COATED ORAL AS NEEDED
Qty: 9 TABLET | Refills: 5 | Status: SHIPPED | OUTPATIENT
Start: 2022-06-15

## 2022-06-21 ENCOUNTER — OFFICE VISIT (OUTPATIENT)
Dept: AUDIOLOGY | Facility: CLINIC | Age: 57
End: 2022-06-21
Payer: MEDICAID

## 2022-06-21 ENCOUNTER — OFFICE VISIT (OUTPATIENT)
Dept: OTOLARYNGOLOGY | Facility: CLINIC | Age: 57
End: 2022-06-21
Payer: MEDICAID

## 2022-06-21 VITALS — TEMPERATURE: 98 F

## 2022-06-21 DIAGNOSIS — H93.19 TINNITUS, UNSPECIFIED LATERALITY: Primary | ICD-10-CM

## 2022-06-21 DIAGNOSIS — H93.11 TINNITUS, RIGHT: Primary | ICD-10-CM

## 2022-06-21 PROCEDURE — 99243 OFF/OP CNSLTJ NEW/EST LOW 30: CPT | Performed by: OTOLARYNGOLOGY

## 2022-06-21 PROCEDURE — 92567 TYMPANOMETRY: CPT | Performed by: AUDIOLOGIST

## 2022-06-21 PROCEDURE — 92557 COMPREHENSIVE HEARING TEST: CPT | Performed by: AUDIOLOGIST

## 2022-06-21 RX ORDER — MELOXICAM 15 MG/1
15 TABLET ORAL DAILY
Qty: 30 TABLET | Refills: 3 | Status: SHIPPED | OUTPATIENT
Start: 2022-06-21

## 2022-06-21 RX ORDER — CYCLOBENZAPRINE HCL 5 MG
5 TABLET ORAL NIGHTLY
Qty: 30 TABLET | Refills: 1 | Status: SHIPPED | OUTPATIENT
Start: 2022-06-21

## 2022-07-10 RX ORDER — TERBINAFINE HYDROCHLORIDE 250 MG/1
TABLET ORAL
Qty: 30 TABLET | Refills: 2 | OUTPATIENT
Start: 2022-07-10

## 2022-08-10 RX ORDER — TERBINAFINE HYDROCHLORIDE 250 MG/1
TABLET ORAL
Qty: 30 TABLET | Refills: 2 | OUTPATIENT
Start: 2022-08-10

## 2022-08-13 RX ORDER — TERBINAFINE HYDROCHLORIDE 250 MG/1
250 TABLET ORAL DAILY
Qty: 30 TABLET | Refills: 0 | Status: SHIPPED | OUTPATIENT
Start: 2022-08-13 | End: 2022-09-19

## 2022-09-07 RX ORDER — LISINOPRIL 40 MG/1
TABLET ORAL
Qty: 90 TABLET | Refills: 1 | Status: SHIPPED | OUTPATIENT
Start: 2022-09-07

## 2022-09-07 RX ORDER — LISINOPRIL 40 MG/1
40 TABLET ORAL DAILY
Qty: 90 TABLET | Refills: 1 | OUTPATIENT
Start: 2022-09-07

## 2022-09-12 RX ORDER — GLYBURIDE-METFORMIN HYDROCHLORIDE 5; 500 MG/1; MG/1
2 TABLET ORAL 2 TIMES DAILY
Qty: 360 TABLET | Refills: 1 | Status: SHIPPED | OUTPATIENT
Start: 2022-09-12

## 2022-09-12 RX ORDER — CANAGLIFLOZIN 100 MG/1
100 TABLET, FILM COATED ORAL DAILY
Qty: 90 TABLET | Refills: 1 | Status: SHIPPED | OUTPATIENT
Start: 2022-09-12

## 2022-09-12 NOTE — TELEPHONE ENCOUNTER
Protocol failed or has No Protocol, please review  Requested Prescriptions   Pending Prescriptions Disp Refills    INVOKANA 100 MG Oral Tab [Pharmacy Med Name: INVOKANA 100MG TABLETS] 90 tablet 0     Sig: TAKE 1 TABLET BY MOUTH EVERY DAY        Diabetes Medication Protocol Failed - 9/12/2022  8:41 AM        Failed - Last A1C < 7.5 and within past 6 months     Lab Results   Component Value Date    A1C 7.6 (A) 06/15/2022               Failed - GFR > 50     No results found for: EGFRCR              Failed - GFR in the past 12 months        Passed - In person appointment or virtual visit in the past 6 mos or appointment in next 3 mos       Recent Outpatient Visits              2 months ago Tinnitus, right    [facility] Audiology Gradie Soulier, Au.D    Office Visit    2 months ago Tinnitus, unspecified laterality    [facility], [address], Jonny Vail MD    Office Visit    2 months ago Type 2 diabetes mellitus without complication, without long-term current use of insulin [facility])    [address], Nito Lloyd MD    Office Visit    4 months ago Right ear pain    [address], Nito Lloyd MD    Office Visit    5 months ago Other noninfectious otitis externa of both ears, unspecified chronicity    [facility], [address], Nito Lloyd MD    Office Visit                    GLYBURIDE-METFORMIN 5-500 MG Oral Tab [Pharmacy Med Name: GLYBURIDE/METFORMIN 5/500MG TABLETS] 360 tablet 2     Sig: TAKE 2 TABLETS BY MOUTH TWICE DAILY        Diabetes Medication Protocol Failed - 9/12/2022  8:41 AM        Failed - Last A1C < 7.5 and within past 6 months     Lab Results   Component Value Date    A1C 7.6 (A) 06/15/2022               Failed - GFR > 50     No results found for: EGFRCR              Failed - GFR in the past 12 months        Passed - In person appointment or virtual visit in the past 6 mos or appointment in next 3 mos       Recent Outpatient Visits              2 months ago Tinnitus, right    [facility], Au.D    Office Visit    2 months ago Tinnitus, unspecified laterality    [facility], [address], Robert Vail MD    Office Visit    2 months ago Type 2 diabetes mellitus without complication, without long-term current use of insulin [facility])    [place] [facility], Nito Barry MD    Office Visit    4 months ago Right ear pain    [address] N[address] MD    Office Visit    5 months ago Other noninfectious otitis externa of both ears, unspecified chronicity    [facility]    Office Visit                       Recent Outpatient Visits              2 months ago Tinnitus, right    [facility], Au.D    Office Visit    2 months ago Tinnitus, unspecified laterality    [facility], [address], Robert Vail MD    Office Visit    2 months ago Type 2 diabetes mellitus without complication, without long-term current use of insulin [facility])    fruux, Nito Barry MD    Office Visit    4 months ago Right ear pain    [place] [facility]    Office Visit    5 months ago Other noninfectious otitis externa of both ears, unspecified chronicity    [address] Arab[address] Alford MD    Office Visit

## 2022-09-19 ENCOUNTER — TELEPHONE (OUTPATIENT)
Dept: FAMILY MEDICINE CLINIC | Facility: CLINIC | Age: 57
End: 2022-09-19

## 2022-09-19 ENCOUNTER — LAB ENCOUNTER (OUTPATIENT)
Dept: LAB | Age: 57
End: 2022-09-19
Attending: FAMILY MEDICINE

## 2022-09-19 ENCOUNTER — OFFICE VISIT (OUTPATIENT)
Dept: FAMILY MEDICINE CLINIC | Facility: CLINIC | Age: 57
End: 2022-09-19
Payer: MEDICAID

## 2022-09-19 ENCOUNTER — HOSPITAL ENCOUNTER (OUTPATIENT)
Dept: GENERAL RADIOLOGY | Age: 57
Discharge: HOME | End: 2022-09-19
Attending: FAMILY MEDICINE

## 2022-09-19 VITALS
BODY MASS INDEX: 25.67 KG/M2 | DIASTOLIC BLOOD PRESSURE: 71 MMHG | HEIGHT: 71 IN | SYSTOLIC BLOOD PRESSURE: 135 MMHG | HEART RATE: 60 BPM | WEIGHT: 183.38 LBS

## 2022-09-19 DIAGNOSIS — B35.1 ONYCHOMYCOSIS OF GREAT TOE: ICD-10-CM

## 2022-09-19 DIAGNOSIS — R07.81 RIB PAIN ON LEFT SIDE: ICD-10-CM

## 2022-09-19 DIAGNOSIS — B35.1 ONYCHOMYCOSIS OF GREAT TOE: Primary | ICD-10-CM

## 2022-09-19 LAB
ALT SERPL-CCNC: 24 U/L
AST SERPL-CCNC: 16 U/L (ref 15–37)
BILIRUB UR QL: NEGATIVE
CLARITY UR: CLEAR
COLOR UR: YELLOW
GLUCOSE UR-MCNC: 500 MG/DL
HGB UR QL STRIP.AUTO: NEGATIVE
KETONES UR-MCNC: NEGATIVE MG/DL
LEUKOCYTE ESTERASE UR QL STRIP.AUTO: NEGATIVE
NITRITE UR QL STRIP.AUTO: NEGATIVE
PH UR: 6 [PH] (ref 5–8)
SP GR UR STRIP: 1.02 (ref 1–1.03)
UROBILINOGEN UR STRIP-ACNC: 0.2

## 2022-09-19 PROCEDURE — 84450 TRANSFERASE (AST) (SGOT): CPT

## 2022-09-19 PROCEDURE — 90670 PCV13 VACCINE IM: CPT | Performed by: FAMILY MEDICINE

## 2022-09-19 PROCEDURE — 99213 OFFICE O/P EST LOW 20 MIN: CPT | Performed by: FAMILY MEDICINE

## 2022-09-19 PROCEDURE — 84460 ALANINE AMINO (ALT) (SGPT): CPT

## 2022-09-19 PROCEDURE — 36415 COLL VENOUS BLD VENIPUNCTURE: CPT

## 2022-09-19 PROCEDURE — 3078F DIAST BP <80 MM HG: CPT | Performed by: FAMILY MEDICINE

## 2022-09-19 PROCEDURE — 3075F SYST BP GE 130 - 139MM HG: CPT | Performed by: FAMILY MEDICINE

## 2022-09-19 PROCEDURE — 81003 URINALYSIS AUTO W/O SCOPE: CPT

## 2022-09-19 PROCEDURE — 3008F BODY MASS INDEX DOCD: CPT | Performed by: FAMILY MEDICINE

## 2022-09-19 PROCEDURE — 90471 IMMUNIZATION ADMIN: CPT | Performed by: FAMILY MEDICINE

## 2022-09-19 PROCEDURE — 71101 X-RAY EXAM UNILAT RIBS/CHEST: CPT | Performed by: FAMILY MEDICINE

## 2022-09-19 RX ORDER — TERBINAFINE HYDROCHLORIDE 250 MG/1
250 TABLET ORAL DAILY
Qty: 30 TABLET | Refills: 2 | Status: SHIPPED | OUTPATIENT
Start: 2022-09-19

## 2022-09-19 RX ORDER — CANAGLIFLOZIN 300 MG/1
300 TABLET, FILM COATED ORAL
Qty: 90 TABLET | Refills: 1 | Status: SHIPPED | OUTPATIENT
Start: 2022-09-19

## 2022-09-21 NOTE — TELEPHONE ENCOUNTER
Scheduled for:  Colonoscopy [id number]  Provider Name: Dr Demetria Hernandez   Date:  03/14/2023  Location: [facility]  Sedation: MAC   Time:  8:30am, arrival 7:300am  Prep:  Golyetly  Meds/Allergies Reconciled?:  Physician reviewed     Diagnosis with codes:  Screening for colon cancer Z12.11  Was patient informed to call insurance with codes (Y/N):  Yes      Referral sent?:  Referral was sent at the time of electronic surgical scheduling. [facility] or [address] [place] [address] notified?: I sent an electronic request to Endo Scheduling and received a confirmation today. .     Medication Orders:  Hold non-metformin diabetic oral med INVOKANA, PIOGLITAZONE, GLYBURIDE-METFORMIN X 2 days prior to procedure (day of prep and day of exam). Misc Orders:  N/A      Further instructions given by staff:  Irish prep instructions were mailed to patient.

## 2022-09-22 ENCOUNTER — MED REC SCAN ONLY (OUTPATIENT)
Dept: FAMILY MEDICINE CLINIC | Facility: CLINIC | Age: 57
End: 2022-09-22

## 2022-11-07 RX ORDER — ATORVASTATIN CALCIUM 40 MG/1
TABLET, FILM COATED ORAL
Qty: 90 TABLET | Refills: 2 | Status: SHIPPED | OUTPATIENT
Start: 2022-11-07

## 2022-11-07 RX ORDER — AMLODIPINE BESYLATE 10 MG/1
TABLET ORAL
Qty: 90 TABLET | Refills: 1 | Status: SHIPPED | OUTPATIENT
Start: 2022-11-07

## 2022-11-07 RX ORDER — ERGOCALCIFEROL 1.25 MG/1
CAPSULE ORAL
Qty: 12 CAPSULE | Refills: 0 | Status: SHIPPED | OUTPATIENT
Start: 2022-11-07

## 2022-12-29 ENCOUNTER — OFFICE VISIT (OUTPATIENT)
Dept: FAMILY MEDICINE CLINIC | Facility: CLINIC | Age: 57
End: 2022-12-29
Payer: MEDICAID

## 2022-12-29 VITALS
BODY MASS INDEX: 25.62 KG/M2 | DIASTOLIC BLOOD PRESSURE: 68 MMHG | HEIGHT: 71 IN | SYSTOLIC BLOOD PRESSURE: 139 MMHG | HEART RATE: 79 BPM | WEIGHT: 183 LBS

## 2022-12-29 DIAGNOSIS — E11.9 TYPE 2 DIABETES MELLITUS WITHOUT COMPLICATION, WITHOUT LONG-TERM CURRENT USE OF INSULIN (HCC): Primary | ICD-10-CM

## 2022-12-29 DIAGNOSIS — I10 ESSENTIAL HYPERTENSION: ICD-10-CM

## 2022-12-29 LAB
CARTRIDGE LOT#: ABNORMAL NUMERIC
HEMOGLOBIN A1C: 8.1 % (ref 4.3–5.6)

## 2022-12-29 PROCEDURE — 3078F DIAST BP <80 MM HG: CPT | Performed by: FAMILY MEDICINE

## 2022-12-29 PROCEDURE — 3052F HG A1C>EQUAL 8.0%<EQUAL 9.0%: CPT | Performed by: FAMILY MEDICINE

## 2022-12-29 PROCEDURE — 3075F SYST BP GE 130 - 139MM HG: CPT | Performed by: FAMILY MEDICINE

## 2022-12-29 PROCEDURE — 83036 HEMOGLOBIN GLYCOSYLATED A1C: CPT | Performed by: FAMILY MEDICINE

## 2022-12-29 PROCEDURE — 3008F BODY MASS INDEX DOCD: CPT | Performed by: FAMILY MEDICINE

## 2022-12-29 PROCEDURE — 99214 OFFICE O/P EST MOD 30 MIN: CPT | Performed by: FAMILY MEDICINE

## 2023-01-09 ENCOUNTER — TELEPHONE (OUTPATIENT)
Dept: GASTROENTEROLOGY | Facility: CLINIC | Age: 58
End: 2023-01-09

## 2023-01-09 NOTE — TELEPHONE ENCOUNTER
Soha Henson states patient used preps as he thought procedure was scheduled for an earlier date than 1/17/2023 CLN. Please call. Thank you.

## 2023-01-10 NOTE — TELEPHONE ENCOUNTER
Patient states he took prep on 1/6 as he thought his procedure date was on 1/7/23. States he even went to hospital thinking that was the date, but was made aware that he was scheduled for 1/17. Dr. Mine Patricia, patient will need new prep sent to pharmacy. rx pended for you to review and sign off if appropriate. Thank you.

## 2023-01-12 NOTE — TELEPHONE ENCOUNTER
Poor fellow, I am so sorry to hear this. Usually we try to fit someone in even when they drink the prep on the wrong day. New prep sent in to [address] [place] [address].

## 2023-01-12 NOTE — TELEPHONE ENCOUNTER
Dr Raymond Singleton,    I spoke to the pt's daughter Shea Zapien,    Her father showed up on a Saturday all prepped. This is why he couldn't get the procedure done. There was nobody there to do it on the weekend.     She will let her dad know there is a new prep at the pharmacy for him

## 2023-01-17 ENCOUNTER — HOSPITAL ENCOUNTER (OUTPATIENT)
Facility: HOSPITAL | Age: 58
Setting detail: HOSPITAL OUTPATIENT SURGERY
Discharge: HOME | End: 2023-01-17
Attending: INTERNAL MEDICINE | Admitting: INTERNAL MEDICINE
Payer: MEDICAID

## 2023-01-17 ENCOUNTER — ANESTHESIA (OUTPATIENT)
Dept: ENDOSCOPY | Facility: HOSPITAL | Age: 58
End: 2023-01-17
Payer: MEDICAID

## 2023-01-17 ENCOUNTER — ANESTHESIA EVENT (OUTPATIENT)
Dept: ENDOSCOPY | Facility: HOSPITAL | Age: 58
End: 2023-01-17
Payer: MEDICAID

## 2023-01-17 VITALS
BODY MASS INDEX: 25.62 KG/M2 | HEART RATE: 51 BPM | RESPIRATION RATE: 17 BRPM | OXYGEN SATURATION: 96 % | DIASTOLIC BLOOD PRESSURE: 72 MMHG | HEIGHT: 71 IN | SYSTOLIC BLOOD PRESSURE: 107 MMHG | WEIGHT: 183 LBS | TEMPERATURE: 99 F

## 2023-01-17 DIAGNOSIS — Z12.11 SCREENING FOR COLON CANCER: ICD-10-CM

## 2023-01-17 LAB — GLUCOSE BLDC GLUCOMTR-MCNC: 153 MG/DL (ref 70–99)

## 2023-01-17 PROCEDURE — 45385 COLONOSCOPY W/LESION REMOVAL: CPT | Performed by: INTERNAL MEDICINE

## 2023-01-17 PROCEDURE — 0DBN8ZX EXCISION OF SIGMOID COLON, VIA NATURAL OR ARTIFICIAL OPENING ENDOSCOPIC, DIAGNOSTIC: ICD-10-PCS | Performed by: INTERNAL MEDICINE

## 2023-01-17 RX ORDER — NICOTINE POLACRILEX 4 MG
15 LOZENGE BUCCAL
Status: DISCONTINUED | OUTPATIENT
Start: 2023-01-17 | End: 2023-01-17

## 2023-01-17 RX ORDER — SODIUM CHLORIDE, SODIUM LACTATE, POTASSIUM CHLORIDE, CALCIUM CHLORIDE 600; 310; 30; 20 MG/100ML; MG/100ML; MG/100ML; MG/100ML
INJECTION, SOLUTION INTRAVENOUS CONTINUOUS
Status: DISCONTINUED | OUTPATIENT
Start: 2023-01-17 | End: 2023-01-17

## 2023-01-17 RX ORDER — LIDOCAINE HYDROCHLORIDE 10 MG/ML
INJECTION, SOLUTION EPIDURAL; INFILTRATION; INTRACAUDAL; PERINEURAL AS NEEDED
Status: DISCONTINUED | OUTPATIENT
Start: 2023-01-17 | End: 2023-01-17 | Stop reason: SURG

## 2023-01-17 RX ORDER — DEXTROSE MONOHYDRATE 25 G/50ML
50 INJECTION, SOLUTION INTRAVENOUS
Status: DISCONTINUED | OUTPATIENT
Start: 2023-01-17 | End: 2023-01-17

## 2023-01-17 RX ORDER — NALOXONE HYDROCHLORIDE 0.4 MG/ML
80 INJECTION, SOLUTION INTRAMUSCULAR; INTRAVENOUS; SUBCUTANEOUS AS NEEDED
Status: DISCONTINUED | OUTPATIENT
Start: 2023-01-17 | End: 2023-01-17

## 2023-01-17 RX ORDER — NICOTINE POLACRILEX 4 MG
30 LOZENGE BUCCAL
Status: DISCONTINUED | OUTPATIENT
Start: 2023-01-17 | End: 2023-01-17

## 2023-01-17 RX ADMIN — LIDOCAINE HYDROCHLORIDE 50 MG: 10 INJECTION, SOLUTION EPIDURAL; INFILTRATION; INTRACAUDAL; PERINEURAL at 14:55:00

## 2023-01-17 RX ADMIN — SODIUM CHLORIDE, SODIUM LACTATE, POTASSIUM CHLORIDE, CALCIUM CHLORIDE: 600; 310; 30; 20 INJECTION, SOLUTION INTRAVENOUS at 14:52:00

## 2023-01-17 RX ADMIN — SODIUM CHLORIDE, SODIUM LACTATE, POTASSIUM CHLORIDE, CALCIUM CHLORIDE: 600; 310; 30; 20 INJECTION, SOLUTION INTRAVENOUS at 15:32:00

## 2023-01-17 NOTE — DISCHARGE INSTRUCTIONS
.  .  .  Notes from Dr Fletcher Wallace:  Two small benign colon polyps were found and removed today - to be sent to the lab to be examined - a letter will be sent with results. You may see small quantities of blood with your next 1-2 bowel movements today. If you check your results on prettysecret[facility]art, the \"pathology\" report on the colon polyp(s) should be released within the next 24-48 hours. In general, a \"hyperplastic\" colon polyp is completely benign, vs an \"adenoma\" or \"sessile serrated adenoma\" which is considered a benign but precancerous colon polyp. That will be explained in a letter/email from me as well. No aspirin, Excedrin, Advil/Motrin/ibuprofen, Aleve/naproxen for next 5 days (to prevent bleeding.)  Internal hemorrhoids - very common  I recommend repeat colonoscopy exam in 5-7 years. .  .  . Home Care Instructions for Colonoscopy with Sedation    Diet:  - Resume your regular diet as tolerated unless otherwise instructed. - Start with light meals to minimize bloating.  - Do not drink alcohol today. Medication:  - If you have questions about resuming your normal medications, please contact your Primary Care Physician. Activities:  - Take it easy today. Do not return to work today. - Do not drive today. - Do not operate any machinery today (including kitchen equipment). Colonoscopy:  - You may notice some rectal \"spotting\" (a little blood on the toilet tissue) for a day or two after the exam. This is normal.  - If you experience any rectal bleeding (not spotting), persistent tenderness or sharp severe abdominal pains, oral temperature over 100 degrees Fahrenheit, light-headedness or dizziness, or any other problems, contact your doctor.     **If unable to reach your doctor, please go to the [facility] Emergency Room**    - Your referring physician will receive a full report of your examination.  - If you do not hear from your doctor's office within two weeks of your biopsy, please call them for your results. You may be able to see your laboratory results in [facility] between 4 and 7 business days. In some cases, your physician may not have viewed the results before they are released to [address] If you have questions regarding your results contact the physician who ordered the test/exam by phone or via [address] by choosing \"Ask a Medical Question. \"

## 2023-01-17 NOTE — ANESTHESIA POSTPROCEDURE EVALUATION
Patient: Stephanie Slight    Procedure Summary     Date: 01/17/23 Room / Location: [address] [place] [address] ENDOSCOPY 05 / [address] [place] [address] ENDOSCOPY    Anesthesia Start: 2332 Anesthesia Stop: 8764    Procedure: COLONOSCOPY-SCREENING Diagnosis:       Screening for colon cancer      (Colon polyps, Hemorrhoids)    Surgeons: Keanu Benites MD Anesthesiologist: Traci Daly CRNA    Anesthesia Type: MAC ASA Status: 2          Anesthesia Type: MAC    Vitals Value Taken Time   BP 90/57 01/17/23 1535   Temp  01/17/23 1535   Pulse 60 01/17/23 1535   Resp 16 01/17/23 1535   SpO2 98 01/17/23 1535       [address] [place] [address] AN Post Evaluation:   Patient Evaluated in PACU  Patient Participation: waiting for patient participation  Level of Consciousness: sleepy but conscious  Pain Management: adequate  Airway Patency:patent  Dental exam unchanged from preop  Yes    Cardiovascular Status: acceptable  Respiratory Status: acceptable  Postoperative Hydration acceptable      Ro Gilliam CRNA  1/17/2023 3:35 PM

## 2023-02-06 ENCOUNTER — TELEPHONE (OUTPATIENT)
Facility: CLINIC | Age: 58
End: 2023-02-06

## 2023-02-06 NOTE — TELEPHONE ENCOUNTER
Results letter mailed to patient per   Colon recall entered for repeat in 7 yrs,1/17/2030  Colon done in 1/17/2023   Updated and Patient Outreach was placed for Colon recall  Emelyn Vieira MD  [facility] Clinical Staff  GI RNs - 1.  Please print and mail this letter to patient; 2. Recall for colonoscopy exam in 7 years

## 2023-03-01 RX ORDER — GLYBURIDE-METFORMIN HYDROCHLORIDE 5; 500 MG/1; MG/1
TABLET ORAL
Qty: 360 TABLET | Refills: 1 | Status: SHIPPED | OUTPATIENT
Start: 2023-03-01

## 2023-03-01 RX ORDER — PIOGLITAZONEHYDROCHLORIDE 30 MG/1
TABLET ORAL
Qty: 90 TABLET | Refills: 2 | Status: SHIPPED | OUTPATIENT
Start: 2023-03-01

## 2023-03-01 RX ORDER — LISINOPRIL 40 MG/1
TABLET ORAL
Qty: 90 TABLET | Refills: 1 | Status: SHIPPED | OUTPATIENT
Start: 2023-03-01

## 2023-03-01 NOTE — TELEPHONE ENCOUNTER
Please review. Protocol Failed or has no Protocol    Requested Prescriptions   Pending Prescriptions Disp Refills    PIOGLITAZONE 30 MG Oral Tab [Pharmacy Med Name: PIOGLITAZONE 30MG TABLETS] 90 tablet 2     Sig: TAKE 1 TABLET(30 MG) BY MOUTH DAILY       Diabetes Medication Protocol Failed - 3/1/2023 10:29 AM        Failed - Last A1C < 7.5 and within past 6 months     Lab Results   Component Value Date    A1C 8.1 (A) 12/29/2022             Failed - EGFRCR or GFRNAA > 50     GFR Evaluation            Failed - GFR in the past 12 months        Passed - In person appointment or virtual visit in the past 6 mos or appointment in next 3 mos     Recent Outpatient Visits              2 months ago Type 2 diabetes mellitus without complication, without long-term current use of insulin ([facility]    [address], Nito Butler MD    Office Visit    5 months ago Onychomycosis of great toe    [address], Cristobal Damico MD    Office Visit    8 months ago Tinnitus, right    [address], [address], [place], [place], Au.D    Office Visit    8 months ago Tinnitus, unspecified laterality    [address], [address], [facility], Pedrito Frausto MD    Office Visit    8 months ago Type 2 diabetes mellitus without complication, without long-term current use of insulin ([facility]    [address], [address], Cristobal Damico MD    Office Visit                        LISINOPRIL 40 MG Oral Tab [Pharmacy Med Name: LISINOPRIL 40MG TABLETS] 90 tablet 1     Sig: TAKE 1 TABLET(40 MG) BY MOUTH DAILY       Hypertensive Medications Protocol Failed - 3/1/2023 10:29 AM        Failed - CMP or BMP in past 6 months     No results found for this or any previous visit (from the past 4392 hour(s)).             Failed - EGFRCR or GFRNAA > 50     GFR Evaluation            Passed - In person appointment in the past 12 or next 3 months     Recent Outpatient Visits              2 months ago Type 2 diabetes mellitus without complication, without long-term current use of insulin ([facility]    [address], Torito Fernandez MD    Office Visit    5 months ago Onychomycosis of great toe    [facility], [address], Torito Fernandez MD    Office Visit    8 months ago Tinnitus, right    [place] , Farrah, Noel Odonnell, Au.D    Office Visit    8 months ago Tinnitus, unspecified laterality    [facility], [address], Julianna Vail MD    Office Visit    8 months ago Type 2 diabetes mellitus without complication, without long-term current use of insulin [facility])    [address], Torito Fernandez MD    Office Visit                      Passed - Last BP reading less than 140/90     BP Readings from Last 1 Encounters:  01/17/23 : 107/72              Passed - In person appointment or virtual visit in the past 6 months     Recent Outpatient Visits              2 months ago Type 2 diabetes mellitus without complication, without long-term current use of insulin ([facility]    [address], Torito Fernandez MD    Office Visit    5 months ago Onychomycosis of great toe    [address], Torito Fernandez MD    Office Visit    8 months ago Tinnitus, right    [facility], [address], Noel Yan, Au.D    Office Visit    8 months ago Tinnitus, unspecified laterality    [facility], [address], Julianna Vail MD    Office Visit    8 months ago Type 2 diabetes mellitus without complication, without long-term current use of insulin [facility])    [address], Torito Fernandez MD    Office Visit GLYBURIDE-METFORMIN 5-500 MG Oral Tab [Pharmacy Med Name: GLYBURIDE/METFORMIN 5/500MG TABLETS] 360 tablet 1     Sig: TAKE 2 TABLETS BY MOUTH TWICE DAILY       Diabetes Medication Protocol Failed - 2/28/2023  5:50 AM        Failed - Last A1C < 7.5 and within past 6 months     Lab Results   Component Value Date    A1C 8.1 (A) 12/29/2022             Failed - EGFRCR or GFRNAA > 50     GFR Evaluation            Failed - GFR in the past 12 months        Passed - In person appointment or virtual visit in the past 6 mos or appointment in next 3 mos     Recent Outpatient Visits              2 months ago Type 2 diabetes mellitus without complication, without long-term current use of insulin ([facility]    [address], [address], Navid Hunt MD    Office Visit    5 months ago Onychomycosis of great toe    [address], [address], Navid Hunt MD    Office Visit    8 months ago Tinnitus, right    [address], [address], [place], Sirena Face, Au.D    Office Visit    8 months ago Tinnitus, unspecified laterality    [address], [address], [place]gjbdv-[place], Vic Collins MD    Office Visit    8 months ago Type 2 diabetes mellitus without complication, without long-term current use of insulin [facility])    [address], [address], Navid Hunt MD    Office Visit

## 2023-04-01 RX ORDER — CANAGLIFLOZIN 300 MG/1
TABLET, FILM COATED ORAL
Qty: 90 TABLET | Refills: 1 | Status: SHIPPED | OUTPATIENT
Start: 2023-04-01

## 2023-04-30 RX ORDER — AMLODIPINE BESYLATE 10 MG/1
10 TABLET ORAL DAILY
Qty: 90 TABLET | Refills: 1 | Status: SHIPPED | OUTPATIENT
Start: 2023-04-30

## 2023-04-30 NOTE — TELEPHONE ENCOUNTER
Please review. Protocol Failed or has No Protocol. Requested Prescriptions   Pending Prescriptions Disp Refills    AMLODIPINE 10 MG Oral Tab [Pharmacy Med Name: AMLODIPINE BESYLATE 10MG TABLETS] 90 tablet 1     Sig: TAKE 1 TABLET(10 MG) BY MOUTH DAILY       Hypertensive Medications Protocol Failed - 4/29/2023  5:58 AM        Failed - CMP or BMP in past 6 months     No results found for this or any previous visit (from the past 4392 hour(s)).               Failed - EGFRCR or GFRNAA > 50     GFR Evaluation              Passed - In person appointment in the past 12 or next 3 months     Recent Outpatient Visits              4 months ago Type 2 diabetes mellitus without complication, without long-term current use of insulin ([facility]    [address], [address], Lisette Farrar MD    Office Visit    7 months ago Onychomycosis of great toe    [address], [address], Lisette Farrar MD    Office Visit    10 months ago Tinnitus, right    [address], [address], Cindi Yan Au.D    Office Visit    10 months ago Tinnitus, unspecified laterality    [facility], [address], Sulp-Grllva-UfbnephNeeta Ryder MD    Office Visit    10 months ago Type 2 diabetes mellitus without complication, without long-term current use of insulin [facility])    [address], [address], Lisette Farrar MD    Office Visit          Future Appointments         Provider Department Appt Notes    In 3 days Noe Torres MD [address], Nito Diabetic f/u               Passed - Last BP reading less than 140/90     BP Readings from Last 1 Encounters:  01/17/23 : 107/72                Passed - In person appointment or virtual visit in the past 6 months     Recent Outpatient Visits              4 months ago Type 2 diabetes mellitus without complication, without long-term current use of insulin [facility])    [address], [address], Nito Torres MD    Office Visit    7 months ago Onychomycosis of great toe    [address], [address], Lisette Farrar MD    Office Visit    10 months ago Tinnitus, right    [address], [place], Cindi Hilding, Au.D    Office Visit    10 months ago Tinnitus, unspecified laterality    [facility], [address], [place], Neeta Kim MD    Office Visit    10 months ago Type 2 diabetes mellitus without complication, without long-term current use of insulin [facility])    [address], [address], Lisette Frarar MD    Office Visit          Future Appointments         Provider Department Appt Notes    In 3 days Noe Torres MD [address] [facility], [place] Diabetic f/u                    Future Appointments         Provider Department Appt Notes    In 3 days Noe Torres MD [address] [facility], [place] Diabetic f/u            Recent Outpatient Visits              4 months ago Type 2 diabetes mellitus without complication, without long-term current use of insulin [facility])    [address], [address], Lisette Farrar MD    Office Visit    7 months ago Onychomycosis of great toe    [address], [address], Lisette Farrar MD    Office Visit    10 months ago Tinnitus, right    [address], [address], [place], Cindi Hilding, Au.D    Office Visit    10 months ago Tinnitus, unspecified laterality    [address], [address], [place], Neeta Kim MD    Office Visit    10 months ago Type 2 diabetes mellitus without complication, without long-term current use of insulin [facility])    [address], [address], Lisette Farrar MD    Office Visit

## 2023-05-03 ENCOUNTER — TELEPHONE (OUTPATIENT)
Dept: FAMILY MEDICINE CLINIC | Facility: CLINIC | Age: 58
End: 2023-05-03

## 2023-05-03 ENCOUNTER — LAB ENCOUNTER (OUTPATIENT)
Dept: LAB | Age: 58
End: 2023-05-03
Attending: FAMILY MEDICINE
Payer: MEDICAID

## 2023-05-03 ENCOUNTER — OFFICE VISIT (OUTPATIENT)
Dept: FAMILY MEDICINE CLINIC | Facility: CLINIC | Age: 58
End: 2023-05-03

## 2023-05-03 VITALS
BODY MASS INDEX: 25.25 KG/M2 | DIASTOLIC BLOOD PRESSURE: 79 MMHG | HEIGHT: 71 IN | HEART RATE: 90 BPM | WEIGHT: 180.38 LBS | SYSTOLIC BLOOD PRESSURE: 137 MMHG

## 2023-05-03 DIAGNOSIS — I10 ESSENTIAL HYPERTENSION: ICD-10-CM

## 2023-05-03 DIAGNOSIS — E11.65 UNCONTROLLED TYPE 2 DIABETES MELLITUS WITH HYPERGLYCEMIA (HCC): ICD-10-CM

## 2023-05-03 DIAGNOSIS — E11.65 UNCONTROLLED TYPE 2 DIABETES MELLITUS WITH HYPERGLYCEMIA (HCC): Primary | ICD-10-CM

## 2023-05-03 LAB
ALBUMIN SERPL-MCNC: 3.9 G/DL (ref 3.4–5)
ALBUMIN/GLOB SERPL: 1 {RATIO} (ref 1–2)
ALP LIVER SERPL-CCNC: 88 U/L
ALT SERPL-CCNC: 23 U/L
ANION GAP SERPL CALC-SCNC: 7 MMOL/L (ref 0–18)
APPEARANCE: CLEAR
AST SERPL-CCNC: 16 U/L (ref 15–37)
BILIRUB SERPL-MCNC: 0.5 MG/DL (ref 0.1–2)
BILIRUBIN: NEGATIVE
BUN BLD-MCNC: 22 MG/DL (ref 7–18)
BUN/CREAT SERPL: 19 (ref 10–20)
CALCIUM BLD-MCNC: 10.4 MG/DL (ref 8.5–10.1)
CARTRIDGE LOT#: ABNORMAL NUMERIC
CHLORIDE SERPL-SCNC: 106 MMOL/L (ref 98–112)
CO2 SERPL-SCNC: 28 MMOL/L (ref 21–32)
CREAT BLD-MCNC: 1.16 MG/DL
CREAT UR-SCNC: 71.4 MG/DL
FASTING STATUS PATIENT QL REPORTED: NO
GFR SERPLBLD BASED ON 1.73 SQ M-ARVRAT: 73 ML/MIN/1.73M2 (ref 60–?)
GLOBULIN PLAS-MCNC: 3.8 G/DL (ref 2.8–4.4)
GLUCOSE (URINE DIPSTICK): 500 MG/DL
GLUCOSE BLD-MCNC: 177 MG/DL (ref 70–99)
HEMOGLOBIN A1C: 8.2 % (ref 4.3–5.6)
KETONES (URINE DIPSTICK): NEGATIVE MG/DL
LEUKOCYTES: NEGATIVE
MICROALBUMIN UR-MCNC: 7.88 MG/DL
MICROALBUMIN/CREAT 24H UR-RTO: 110.4 UG/MG (ref ?–30)
MULTISTIX LOT#: ABNORMAL NUMERIC
NITRITE, URINE: NEGATIVE
OCCULT BLOOD: NEGATIVE
OSMOLALITY SERPL CALC.SUM OF ELEC: 300 MOSM/KG (ref 275–295)
PH, URINE: 5.5 (ref 4.5–8)
POTASSIUM SERPL-SCNC: 4.4 MMOL/L (ref 3.5–5.1)
PROT SERPL-MCNC: 7.7 G/DL (ref 6.4–8.2)
PROTEIN (URINE DIPSTICK): NEGATIVE MG/DL
SODIUM SERPL-SCNC: 141 MMOL/L (ref 136–145)
SPECIFIC GRAVITY: 1.01 (ref 1–1.03)
URINE-COLOR: YELLOW
UROBILINOGEN,SEMI-QN: 0.2 MG/DL (ref 0–1.9)

## 2023-05-03 PROCEDURE — 3078F DIAST BP <80 MM HG: CPT | Performed by: FAMILY MEDICINE

## 2023-05-03 PROCEDURE — 3075F SYST BP GE 130 - 139MM HG: CPT | Performed by: FAMILY MEDICINE

## 2023-05-03 PROCEDURE — 82043 UR ALBUMIN QUANTITATIVE: CPT

## 2023-05-03 PROCEDURE — 36415 COLL VENOUS BLD VENIPUNCTURE: CPT

## 2023-05-03 PROCEDURE — 99214 OFFICE O/P EST MOD 30 MIN: CPT | Performed by: FAMILY MEDICINE

## 2023-05-03 PROCEDURE — 83036 HEMOGLOBIN GLYCOSYLATED A1C: CPT | Performed by: FAMILY MEDICINE

## 2023-05-03 PROCEDURE — 81003 URINALYSIS AUTO W/O SCOPE: CPT | Performed by: FAMILY MEDICINE

## 2023-05-03 PROCEDURE — 80053 COMPREHEN METABOLIC PANEL: CPT

## 2023-05-03 PROCEDURE — 3008F BODY MASS INDEX DOCD: CPT | Performed by: FAMILY MEDICINE

## 2023-05-03 PROCEDURE — 3052F HG A1C>EQUAL 8.0%<EQUAL 9.0%: CPT | Performed by: FAMILY MEDICINE

## 2023-05-03 PROCEDURE — 82570 ASSAY OF URINE CREATININE: CPT

## 2023-05-03 RX ORDER — SEMAGLUTIDE 1.34 MG/ML
INJECTION, SOLUTION SUBCUTANEOUS
Qty: 5 EACH | Refills: 3 | Status: SHIPPED | OUTPATIENT
Start: 2023-05-03

## 2023-05-12 NOTE — TELEPHONE ENCOUNTER
OZEMPIC NO LONGER COMEs IN 1.5ML, 3ML PENDED    Prior authorization initiated for 3ml pen,await 1-5 days for decision

## 2023-05-14 RX ORDER — SEMAGLUTIDE 0.68 MG/ML
0.25 INJECTION, SOLUTION SUBCUTANEOUS
Qty: 3 ML | Refills: 1 | Status: SHIPPED | OUTPATIENT
Start: 2023-05-14

## 2023-05-15 RX ORDER — ORAL SEMAGLUTIDE 7 MG/1
1 TABLET ORAL DAILY
Qty: 90 TABLET | Refills: 1 | Status: SHIPPED | OUTPATIENT
Start: 2023-05-15 | End: 2023-06-14

## 2023-05-15 RX ORDER — ORAL SEMAGLUTIDE 3 MG/1
1 TABLET ORAL DAILY
Qty: 30 TABLET | Refills: 0 | Status: SHIPPED | OUTPATIENT
Start: 2023-05-15 | End: 2023-06-14

## 2023-05-17 ENCOUNTER — TELEPHONE (OUTPATIENT)
Dept: FAMILY MEDICINE CLINIC | Facility: CLINIC | Age: 58
End: 2023-05-17

## 2023-05-17 NOTE — TELEPHONE ENCOUNTER
Patient states that he is at his appt for Sil Pantoja now and he needs his referral sent to the office.       Fax# [phone]

## 2023-05-29 RX ORDER — HYDROCHLOROTHIAZIDE 25 MG/1
25 TABLET ORAL DAILY
Qty: 90 TABLET | Refills: 3 | Status: SHIPPED | OUTPATIENT
Start: 2023-05-29

## 2023-05-30 NOTE — TELEPHONE ENCOUNTER
Refill passed per [employer], [employer] protocol.   Requested Prescriptions   Pending Prescriptions Disp Refills    HYDROCHLOROTHIAZIDE 25 MG Oral Tab [Pharmacy Med Name: HYDROCHLOROTHIAZIDE 25MG TABLETS] 90 tablet 1     Sig: TAKE 1 TABLET(25 MG) BY MOUTH DAILY       Hypertensive Medications Protocol Passed - 5/29/2023  5:52 AM        Passed - In person appointment in the past 12 or next 3 months     Recent Outpatient Visits              3 weeks ago Uncontrolled type 2 diabetes mellitus with hyperglycemia ([facility]    [address], [address], Sandra Adhikari MD    Office Visit    5 months ago Type 2 diabetes mellitus without complication, without long-term current use of insulin [facility])    [address], [address], Sandra Adhikari MD    Office Visit    8 months ago Onychomycosis of great toe    [address], [address], Sandra Adhikari MD    Office Visit    11 months ago Tinnitus, right    [facility], [address], Malinda Yan, Au.PARKER    Office Visit    11 months ago Tinnitus, unspecified laterality    [facility], [address], Sami Vail MD    Office Visit                      Passed - Last BP reading less than 140/90     BP Readings from Last 1 Encounters:  05/03/23 : 137/79                Passed - CMP or BMP in past 6 months     Recent Results (from the past 4392 hour(s))   COMP METABOLIC PANEL (14)    Collection Time: 05/03/23  1:14 PM   Result Value Ref Range    Glucose 177 (H) 70 - 99 mg/dL    Sodium 141 136 - 145 mmol/L    Potassium 4.4 3.5 - 5.1 mmol/L    Chloride 106 98 - 112 mmol/L    CO2 28.0 21.0 - 32.0 mmol/L    Anion Gap 7 0 - 18 mmol/L    BUN 22 (H) 7 - 18 mg/dL    Creatinine 1.16 0.70 - 1.30 mg/dL    BUN/CREA Ratio 19.0 10.0 - 20.0    Calcium, Total 10.4 (H) 8.5 - 10.1 mg/dL    Calculated Osmolality 300 (H) 275 - 295 mOsm/kg    eGFR-Cr 73 >=60 mL/min/1.73m2    ALT 23 16 - 61 U/L    AST 16 15 - 37 U/L    Alkaline Phosphatase 88 45 - 117 U/L    Bilirubin, Total 0.5 0.1 - 2.0 mg/dL    Total Protein 7.7 6.4 - 8.2 g/dL    Albumin 3.9 3.4 - 5.0 g/dL    Globulin  3.8 2.8 - 4.4 g/dL    A/G Ratio 1.0 1.0 - 2.0    Patient Fasting for CMP? No      *Note: Due to a large number of results and/or encounters for the requested time period, some results have not been displayed. A complete set of results can be found in Results Review.                  Passed - In person appointment or virtual visit in the past 6 months     Recent Outpatient Visits              3 weeks ago Uncontrolled type 2 diabetes mellitus with hyperglycemia [facility])    [address], [address], Nito Ba MD    Office Visit    5 months ago Type 2 diabetes mellitus without complication, without long-term current use of insulin [facility])    [address], [address], Roselyn Habermann, MD    Office Visit    8 months ago Onychomycosis of great toe    [address], [address], Roselyn Habermann, MD    Office Visit    11 months ago Tinnitus, right    [address], [address], [place], Joe Orn, Au.D    Office Visit    11 months ago Tinnitus, unspecified laterality    [facility], [address], Mtkm-Ezpimo-VbczyyoYadira Ryder MD    Office Visit                      Passed - EGFRCR or GFRNAA > 50     GFR Evaluation  EGFRCR: 73 , resulted on 5/3/2023               Recent Outpatient Visits              3 weeks ago Uncontrolled type 2 diabetes mellitus with hyperglycemia [facility])    [address], [address], Roselyn Habermann, MD    Office Visit    5 months ago Type 2 diabetes mellitus without complication, without long-term current use of insulin [facility])    [address], Roselyn Habermann, MD    Office Visit    8 months ago Onychomycosis of great toe [address] [facility], Nito Begum MD    Office Visit    11 months ago Tinnitus, right    [address] [facility], Cornelia Yan Au.D    Office Visit    11 months ago Tinnitus, unspecified laterality    [address], [address] [facility],[address], Toeg-Umlsuc-XokwwkqChicho Ryder MD    Office Visit

## 2023-06-06 ENCOUNTER — MED REC SCAN ONLY (OUTPATIENT)
Dept: FAMILY MEDICINE CLINIC | Facility: CLINIC | Age: 58
End: 2023-06-06

## 2023-07-28 RX ORDER — ATORVASTATIN CALCIUM 40 MG/1
40 TABLET, FILM COATED ORAL DAILY
Qty: 90 TABLET | Refills: 2 | Status: SHIPPED | OUTPATIENT
Start: 2023-07-28

## 2023-07-28 NOTE — TELEPHONE ENCOUNTER
Please review. Protocol failed / Has no protocol.      No Active/ Future labs pended for Lipids    Requested Prescriptions   Pending Prescriptions Disp Refills    ATORVASTATIN 40 MG Oral Tab [Pharmacy Med Name: ATORVASTATIN 40MG TABLETS] 90 tablet 2     Sig: TAKE 1 TABLET(40 MG) BY MOUTH DAILY       Cholesterol Medication Protocol Failed - 7/28/2023  5:53 AM        Failed - LDL in past 12 months        Failed - Last LDL < 130     Lab Results   Component Value Date     (H) 09/16/2020             Passed - ALT in past 12 months        Passed - Last ALT < 80     Lab Results   Component Value Date    ALT 23 05/03/2023             Passed - In person appointment or virtual visit in the past 12 mos or appointment in next 3 mos     Recent Outpatient Visits              2 months ago Uncontrolled type 2 diabetes mellitus with hyperglycemia [facility])    Nito Hannon MD    Office Visit    7 months ago Type 2 diabetes mellitus without complication, without long-term current use of insulin [facility])    Mitch Ivory, Gabriela Duenas MD    Office Visit    10 months ago Onychomycosis of great toe    Mitch Ivory, Gabriela Duenas MD    Office Visit    1 year ago Tinnitus, right    Ovidio Hannon Au.D    Office Visit    1 year ago Tinnitus, unspecified laterality    Madhu Alonzo, [address], [place], Amadeo Hassan MD    Office Visit                            Recent Outpatient Visits              2 months ago Uncontrolled type 2 diabetes mellitus with hyperglycemia [facility])    Mitch Ivory Cloyde Manis, MD    Office Visit    7 months ago Type 2 diabetes mellitus without complication, without long-term current use of insulin [facility])    Mitch Ivory Addison Arya Muir MD    Office Visit    10 months ago Onychomycosis of great toe    [address], Lona Phoenix, MD    Office Visit    1 year ago Tinnitus, right    [address] [facility], March[place]raida Billy.PARKER    Office Visit    1 year ago Tinnitus, unspecified laterality    [address], [address], [place] Esperanza Henning MD    Office Visit

## 2023-08-24 ENCOUNTER — OFFICE VISIT (OUTPATIENT)
Dept: FAMILY MEDICINE CLINIC | Facility: CLINIC | Age: 58
End: 2023-08-24

## 2023-08-24 ENCOUNTER — TELEPHONE (OUTPATIENT)
Dept: FAMILY MEDICINE CLINIC | Facility: CLINIC | Age: 58
End: 2023-08-24

## 2023-08-24 VITALS
HEIGHT: 71 IN | BODY MASS INDEX: 25.23 KG/M2 | DIASTOLIC BLOOD PRESSURE: 79 MMHG | WEIGHT: 180.19 LBS | HEART RATE: 73 BPM | SYSTOLIC BLOOD PRESSURE: 131 MMHG

## 2023-08-24 DIAGNOSIS — I10 ESSENTIAL HYPERTENSION: ICD-10-CM

## 2023-08-24 DIAGNOSIS — E11.9 TYPE 2 DIABETES MELLITUS WITHOUT COMPLICATION, WITHOUT LONG-TERM CURRENT USE OF INSULIN (HCC): Primary | ICD-10-CM

## 2023-08-24 LAB
CARTRIDGE EXPIRATION DATE: ABNORMAL DATE
CARTRIDGE LOT#: ABNORMAL NUMERIC
HEMOGLOBIN A1C: 7.4 % (ref 4.3–5.6)

## 2023-08-24 RX ORDER — ORAL SEMAGLUTIDE 14 MG/1
14 TABLET ORAL DAILY
Qty: 90 TABLET | Refills: 1 | Status: SHIPPED | OUTPATIENT
Start: 2023-08-24 | End: 2023-08-24

## 2023-08-24 RX ORDER — ORAL SEMAGLUTIDE 14 MG/1
14 TABLET ORAL DAILY
Qty: 90 TABLET | Refills: 1 | Status: SHIPPED | OUTPATIENT
Start: 2023-08-24 | End: 2023-09-23

## 2023-08-28 RX ORDER — GLYBURIDE-METFORMIN HYDROCHLORIDE 5; 500 MG/1; MG/1
2 TABLET ORAL 2 TIMES DAILY
Qty: 360 TABLET | Refills: 3 | Status: SHIPPED | OUTPATIENT
Start: 2023-08-28

## 2023-08-28 RX ORDER — LISINOPRIL 40 MG/1
40 TABLET ORAL DAILY
Qty: 90 TABLET | Refills: 3 | Status: SHIPPED | OUTPATIENT
Start: 2023-08-28

## 2023-08-28 NOTE — TELEPHONE ENCOUNTER
Refill passed per [address] [facility] protocol.   Requested Prescriptions   Pending Prescriptions Disp Refills    LISINOPRIL 40 MG Oral Tab [Pharmacy Med Name: LISINOPRIL 40MG TABLETS] 90 tablet 1     Sig: TAKE 1 TABLET(40 MG) BY MOUTH DAILY       Hypertensive Medications Protocol Passed - 8/27/2023  5:52 AM        Passed - In person appointment in the past 12 or next 3 months     Recent Outpatient Visits              4 days ago Type 2 diabetes mellitus without complication, without long-term current use of insulin ([facility]    Mitch Osborne Devona Channel, MD    Office Visit    3 months ago Uncontrolled type 2 diabetes mellitus with hyperglycemia [facility])    Mitch Osborne Devona Channel, MD    Office Visit    8 months ago Type 2 diabetes mellitus without complication, without long-term current use of insulin [facility])    Mitch Osborne Devona Channel, MD    Office Visit    11 months ago Onychomycosis of great toe    Mitch Osborne Devona Channel, MD    Office Visit    1 year ago Tinnitus, right    [facility], [address], [facility], Au.D    Office Visit                      Passed - Last BP reading less than 140/90     BP Readings from Last 1 Encounters:  08/24/23 : 131/79              Passed - CMP or BMP in past 6 months     Recent Results (from the past 4392 hour(s))   COMP METABOLIC PANEL (14)    Collection Time: 05/03/23  1:14 PM   Result Value Ref Range    Glucose 177 (H) 70 - 99 mg/dL    Sodium 141 136 - 145 mmol/L    Potassium 4.4 3.5 - 5.1 mmol/L    Chloride 106 98 - 112 mmol/L    CO2 28.0 21.0 - 32.0 mmol/L    Anion Gap 7 0 - 18 mmol/L    BUN 22 (H) 7 - 18 mg/dL    Creatinine 1.16 0.70 - 1.30 mg/dL    BUN/CREA Ratio 19.0 10.0 - 20.0    Calcium, Total 10.4 (H) 8.5 - 10.1 mg/dL    Calculated Osmolality 300 (H) 275 - 295 mOsm/kg eGFR-Cr 73 >=60 mL/min/1.73m2    ALT 23 16 - 61 U/L    AST 16 15 - 37 U/L    Alkaline Phosphatase 88 45 - 117 U/L    Bilirubin, Total 0.5 0.1 - 2.0 mg/dL    Total Protein 7.7 6.4 - 8.2 g/dL    Albumin 3.9 3.4 - 5.0 g/dL    Globulin  3.8 2.8 - 4.4 g/dL    A/G Ratio 1.0 1.0 - 2.0    Patient Fasting for CMP? No      *Note: Due to a large number of results and/or encounters for the requested time period, some results have not been displayed. A complete set of results can be found in Results Review.                Passed - In person appointment or virtual visit in the past 6 months     Recent Outpatient Visits              4 days ago Type 2 diabetes mellitus without complication, without long-term current use of insulin [facility]    Mitch Deshpande, Tian Galaviz MD    Office Visit    3 months ago Uncontrolled type 2 diabetes mellitus with hyperglycemia [facility])    Mitch Deshpande, Tian Galaviz MD    Office Visit    8 months ago Type 2 diabetes mellitus without complication, without long-term current use of insulin [facility])    Mitch Deshpande, Tian Galaviz MD    Office Visit    11 months ago Onychomycosis of great toe    Mitch Deshpande, Tian Galaviz MD    Office Visit    1 year ago Tinnitus, right    Suellen Mckeon, [address], [facility].D    Office Visit                      Passed - EGFRCR or GFRNAA > 50     GFR Evaluation  EGFRCR: 73 , resulted on 5/3/2023            GLYBURIDE-METFORMIN 5-500 MG Oral Tab [Pharmacy Med Name: GLYBURIDE/METFORMIN 5/500MG TABLETS] 360 tablet 1     Sig: TAKE 2 TABLETS BY MOUTH TWICE DAILY       Diabetes Medication Protocol Passed - 8/27/2023  5:52 AM        Passed - Last A1C < 7.5 and within past 6 months     Lab Results   Component Value Date    A1C 7.4 (A) 08/24/2023             Passed - In person appointment or virtual visit in the past 6 mos or appointment in next 3 mos     Recent Outpatient Visits              4 days ago Type 2 diabetes mellitus without complication, without long-term current use of insulin [facility]    [address], [address], Frances Mcardle, MD    Office Visit    3 months ago Uncontrolled type 2 diabetes mellitus with hyperglycemia [facility])    [address], [address], Frances Mcardle, MD    Office Visit    8 months ago Type 2 diabetes mellitus without complication, without long-term current use of insulin [facility])    [address], [address], Frances Mcardle, MD    Office Visit    11 months ago Onychomycosis of great toe    [address], [address], Frances Mcardle, MD    Office Visit    1 year ago Tinnitus, right    [address], [address],[facility] [facility]    Office Visit                      Passed - EGFRCR or GFRNAA > 50     GFR Evaluation  EGFRCR: 73 , resulted on 5/3/2023          Passed - GFR in the past 12 months           Recent Outpatient Visits              4 days ago Type 2 diabetes mellitus without complication, without long-term current use of insulin [facility])    [address], [address], Frances Mcardle, MD    Office Visit    3 months ago Uncontrolled type 2 diabetes mellitus with hyperglycemia [facility])    [address], [address], Frances Mcardle, MD    Office Visit    8 months ago Type 2 diabetes mellitus without complication, without long-term current use of insulin [facility])    [address], [address], Frances Mcardle, MD    Office Visit    11 months ago Onychomycosis of great toe    [address] [place] [address], Frances Mcardle, MD    Office Visit    1 year ago Tinnitus, right    [address], [address], [facility] Khadijah Seek, Au.PARKER    Office Visit

## 2023-10-26 ENCOUNTER — TELEPHONE (OUTPATIENT)
Dept: FAMILY MEDICINE CLINIC | Facility: CLINIC | Age: 58
End: 2023-10-26

## 2023-10-27 RX ORDER — AMLODIPINE BESYLATE 10 MG/1
10 TABLET ORAL DAILY
Qty: 90 TABLET | Refills: 2 | Status: SHIPPED | OUTPATIENT
Start: 2023-10-27

## 2023-10-27 NOTE — TELEPHONE ENCOUNTER
Pt phoned back and was informed of the message below. Pt stated that he is in [facility] and will call back to schedule an appointment. Pt can be reached at [phone].

## 2023-10-27 NOTE — TELEPHONE ENCOUNTER
Refill passed per [facility] protocol.    Requested Prescriptions   Pending Prescriptions Disp Refills    AMLODIPINE 10 MG Oral Tab [Pharmacy Med Name: AMLODIPINE BESYLATE 10MG TABLETS] 90 tablet 1     Sig: TAKE 1 TABLET(10 MG) BY MOUTH DAILY       Hypertensive Medications Protocol Passed - 10/26/2023  5:49 AM        Passed - In person appointment in the past 12 or next 3 months     Recent Outpatient Visits              2 months ago Type 2 diabetes mellitus without complication, without long-term current use of insulin ([facility])    [facility], Pasquale Santoro MD    Office Visit    5 months ago Uncontrolled type 2 diabetes mellitus with hyperglycemia ([facility])    [facility]    Office Visit    10 months ago Type 2 diabetes mellitus without complication, without long-term current use of insulin ([facility])    [facility]    Office Visit    1 year ago Onychomycosis of great toe    [facility]    Office Visit    1 year ago Tinnitus, right    [facility] [place] Brigid Tripp Au.D    Office Visit                      Passed - Last BP reading less than 140/90     BP Readings from Last 1 Encounters:  08/24/23 : 131/79              Passed - CMP or BMP in past 6 months     Recent Results (from the past 4392 hour(s))   Comp Metabolic Panel (14)    Collection Time: 05/03/23  1:14 PM   Result Value Ref Range    Glucose 177 (H) 70 - 99 mg/dL    Sodium 141 136 - 145 mmol/L    Potassium 4.4 3.5 - 5.1 mmol/L    Chloride 106 98 - 112 mmol/L    CO2 28.0 21.0 - 32.0 mmol/L    Anion Gap 7 0 - 18 mmol/L    BUN 22 (H) 7 - 18 mg/dL    Creatinine 1.16 0.70 - 1.30 mg/dL    BUN/CREA Ratio 19.0 10.0 - 20.0    Calcium, Total 10.4 (H) 8.5 - 10.1 mg/dL    Calculated Osmolality 300 (H) 275 - 295  mOsm/kg    eGFR-Cr 73 >=60 mL/min/1.73m2    ALT 23 16 - 61 U/L    AST 16 15 - 37 U/L    Alkaline Phosphatase 88 45 - 117 U/L    Bilirubin, Total 0.5 0.1 - 2.0 mg/dL    Total Protein 7.7 6.4 - 8.2 g/dL    Albumin 3.9 3.4 - 5.0 g/dL    Globulin  3.8 2.8 - 4.4 g/dL    A/G Ratio 1.0 1.0 - 2.0    Patient Fasting for CMP? No      *Note: Due to a large number of results and/or encounters for the requested time period, some results have not been displayed. A complete set of results can be found in Results Review.               Passed - In person appointment or virtual visit in the past 6 months     Recent Outpatient Visits              2 months ago Type 2 diabetes mellitus without complication, without long-term current use of insulin ([facility])    [facility], Pasquale Santoro MD    Office Visit    5 months ago Uncontrolled type 2 diabetes mellitus with hyperglycemia ([facility])    [facility], Pasquale Santoro MD    Office Visit    10 months ago Type 2 diabetes mellitus without complication, without long-term current use of insulin ([facility])    [facility], Pasquale Santoro MD    Office Visit    1 year ago Onychomycosis of great toe    [facility]    Office Visit    1 year ago Tinnitus, right    [facility]    Office Visit                      Passed - EGFRCR or GFRNAA > 50     GFR Evaluation  EGFRCR: 73 , resulted on 5/3/2023               Recent Outpatient Visits              2 months ago Type 2 diabetes mellitus without complication, without long-term current use of insulin ([facility])    [facility]    Office Visit    5 months ago Uncontrolled type 2 diabetes mellitus with hyperglycemia ([facility])    [employer]  Pedro, Pasquale Santoro MD    Office Visit    10 months ago Type 2 diabetes mellitus without complication, without long-term current use of insulin ([facility])    [facility] [address] Ric[address]    Office Visit    1 year ago [facility]ychomycosis of great toe    [facility] [address], Pasquale Santoro MD    Office Visit    1 year ago Tinnitus, right    [facility] CorneliaBrigid Benz Au.D    Office Visit

## 2023-10-30 NOTE — TELEPHONE ENCOUNTER
Left detailed message informing pt Reanna Hennessy will see him for his Px when he returns its nothing urgent. He can call us back if he has questions or concerns. Please transfer to triage. 1st attempt.

## 2023-11-21 ENCOUNTER — MED REC SCAN ONLY (OUTPATIENT)
Dept: FAMILY MEDICINE CLINIC | Facility: CLINIC | Age: 58
End: 2023-11-21

## 2023-11-25 RX ORDER — PIOGLITAZONEHYDROCHLORIDE 30 MG/1
30 TABLET ORAL DAILY
Qty: 90 TABLET | Refills: 3 | Status: SHIPPED | OUTPATIENT
Start: 2023-11-25

## 2023-11-25 NOTE — TELEPHONE ENCOUNTER
Refill passed per [facility] protocol.     Requested Prescriptions   Pending Prescriptions Disp Refills    PIOGLITAZONE 30 MG Oral Tab [Pharmacy Med Name: PIOGLITAZONE 30MG TABLETS] 90 tablet 2     Sig: TAKE 1 TABLET(30 MG) BY MOUTH DAILY       Diabetes Medication Protocol Passed - 11/25/2023  5:52 AM        Passed - Last A1C < 7.5 and within past 6 months     Lab Results   Component Value Date    A1C 7.4 (A) 08/24/2023             Passed - In person appointment or virtual visit in the past 6 mos or appointment in next 3 mos     Recent Outpatient Visits              3 months ago Type 2 diabetes mellitus without complication, without long-term current use of insulin ([facility]    [address], [address], Nino Park MD    Office Visit    6 months ago Uncontrolled type 2 diabetes mellitus with hyperglycemia [facility])    [address], [address], Nino Park MD    Office Visit    11 months ago Type 2 diabetes mellitus without complication, without long-term current use of insulin [facility])    [address], [address], Nino Park MD    Office Visit    1 year ago Onychomycosis of great toe    [address], [address], Nino Park MD    Office Visit    1 year ago Tinnitus, right    [address], [address] [facility],[address], [facility] Au.    Office Visit                      Passed - EGFRCR or GFRNAA > 50     GFR Evaluation  EGFRCR: 73 , resulted on 5/3/2023          Passed - GFR in the past 12 months             Recent Outpatient Visits              3 months ago Type 2 diabetes mellitus without complication, without long-term current use of insulin [facility])    [address], [address], Nino Park MD    Office Visit    6 months ago Uncontrolled type 2 diabetes mellitus with hyperglycemia [facility])    [address], [address] Markus Quigley MD    Office Visit    11 months ago Type 2 diabetes mellitus without complication, without long-term current use of insulin [facility])    [address] [facility], Navid Hunt MD    Office Visit    1 year ago Onychomycosis of great toe    [address], Navid Hunt MD    Office Visit    1 year ago Tinnitus, right    [address] [facility], Sirena Yan Au.D    Office Visit

## 2024-04-12 ENCOUNTER — OFFICE VISIT (OUTPATIENT)
Dept: FAMILY MEDICINE CLINIC | Facility: CLINIC | Age: 59
End: 2024-04-12

## 2024-04-12 VITALS
WEIGHT: 186.38 LBS | HEIGHT: 71 IN | HEART RATE: 68 BPM | SYSTOLIC BLOOD PRESSURE: 138 MMHG | BODY MASS INDEX: 26.09 KG/M2 | DIASTOLIC BLOOD PRESSURE: 78 MMHG

## 2024-04-12 DIAGNOSIS — E11.65 UNCONTROLLED TYPE 2 DIABETES MELLITUS WITH HYPERGLYCEMIA (HCC): Primary | ICD-10-CM

## 2024-04-12 DIAGNOSIS — I10 ESSENTIAL HYPERTENSION: ICD-10-CM

## 2024-04-12 DIAGNOSIS — E11.3299 TYPE 2 DIABETES MELLITUS WITH MILD NONPROLIFERATIVE RETINOPATHY WITHOUT MACULAR EDEMA, WITHOUT LONG-TERM CURRENT USE OF INSULIN, UNSPECIFIED LATERALITY (HCC): ICD-10-CM

## 2024-04-12 LAB
CARTRIDGE LOT#: ABNORMAL NUMERIC
HEMOGLOBIN A1C: 8.4 % (ref 4.3–5.6)

## 2024-04-12 PROCEDURE — 83036 HEMOGLOBIN GLYCOSYLATED A1C: CPT | Performed by: FAMILY MEDICINE

## 2024-04-12 PROCEDURE — 99214 OFFICE O/P EST MOD 30 MIN: CPT | Performed by: FAMILY MEDICINE

## 2024-04-12 RX ORDER — PIOGLITAZONEHYDROCHLORIDE 30 MG/1
30 TABLET ORAL DAILY
Qty: 90 TABLET | Refills: 3 | Status: SHIPPED | OUTPATIENT
Start: 2024-04-12

## 2024-04-12 RX ORDER — PIOGLITAZONEHYDROCHLORIDE 30 MG/1
30 TABLET ORAL DAILY
Qty: 90 TABLET | Refills: 3 | Status: SHIPPED | OUTPATIENT
Start: 2024-04-12 | End: 2024-04-12

## 2024-04-12 RX ORDER — GLYBURIDE-METFORMIN HYDROCHLORIDE 5; 500 MG/1; MG/1
2 TABLET ORAL 2 TIMES DAILY
Qty: 360 TABLET | Refills: 3 | Status: SHIPPED | OUTPATIENT
Start: 2024-04-12 | End: 2024-04-12

## 2024-04-12 RX ORDER — ATORVASTATIN CALCIUM 40 MG/1
40 TABLET, FILM COATED ORAL DAILY
Qty: 90 TABLET | Refills: 2 | Status: SHIPPED | OUTPATIENT
Start: 2024-04-12

## 2024-04-12 RX ORDER — HYDROCHLOROTHIAZIDE 25 MG/1
25 TABLET ORAL DAILY
Qty: 90 TABLET | Refills: 3 | Status: SHIPPED | OUTPATIENT
Start: 2024-04-12

## 2024-04-12 RX ORDER — GLYBURIDE-METFORMIN HYDROCHLORIDE 5; 500 MG/1; MG/1
2 TABLET ORAL 2 TIMES DAILY
Qty: 360 TABLET | Refills: 3 | Status: SHIPPED | OUTPATIENT
Start: 2024-04-12

## 2024-04-12 RX ORDER — ORAL SEMAGLUTIDE 14 MG/1
14 TABLET ORAL DAILY
Qty: 90 TABLET | Refills: 1 | Status: SHIPPED | OUTPATIENT
Start: 2024-04-12 | End: 2024-05-12

## 2024-04-12 RX ORDER — ATORVASTATIN CALCIUM 40 MG/1
40 TABLET, FILM COATED ORAL DAILY
Qty: 90 TABLET | Refills: 2 | Status: SHIPPED | OUTPATIENT
Start: 2024-04-12 | End: 2024-04-12

## 2024-04-12 RX ORDER — LANCETS 33 GAUGE
1 EACH MISCELLANEOUS 2 TIMES DAILY
Qty: 200 EACH | Refills: 0 | Status: SHIPPED | OUTPATIENT
Start: 2024-04-12

## 2024-04-12 RX ORDER — LISINOPRIL 40 MG/1
40 TABLET ORAL DAILY
Qty: 90 TABLET | Refills: 3 | Status: SHIPPED | OUTPATIENT
Start: 2024-04-12

## 2024-04-12 RX ORDER — AMLODIPINE BESYLATE 10 MG/1
10 TABLET ORAL DAILY
Qty: 90 TABLET | Refills: 2 | Status: SHIPPED | OUTPATIENT
Start: 2024-04-12 | End: 2024-04-12

## 2024-04-12 RX ORDER — ORAL SEMAGLUTIDE 14 MG/1
14 TABLET ORAL DAILY
Qty: 30 TABLET | Refills: 3 | Status: SHIPPED | OUTPATIENT
Start: 2024-04-12 | End: 2024-05-12

## 2024-04-12 RX ORDER — BLOOD SUGAR DIAGNOSTIC
1 STRIP MISCELLANEOUS 2 TIMES DAILY
Qty: 100 STRIP | Refills: 5 | Status: SHIPPED | OUTPATIENT
Start: 2024-04-12

## 2024-04-12 RX ORDER — AMLODIPINE BESYLATE 10 MG/1
10 TABLET ORAL DAILY
Qty: 90 TABLET | Refills: 2 | Status: SHIPPED | OUTPATIENT
Start: 2024-04-12

## 2024-04-12 NOTE — PROGRESS NOTES
4/12/2024  10:58 AM    Bobby Meneses is a 59 year old male.    Chief complaint(s):   Chief Complaint   Patient presents with    Diabetes     F/u     Leg Pain     X4 weeks , left leg      HPI:     Bobby Meneses primary complaint is regarding as above.     Patient Bobby Meneses is a 59 year old male is here to be evaluated for type 2 diabetes.  Specifically, male has type 2, insulin none requiring diabetes. Compliance with treatment has been good.  Patient's diabetes was first diagnosed 15 years ago.  Patient follows a 2000 calorie ADA diet.  Patient report experiencing the following diabetes related symptoms; Negative for polyuria, Negative for polydipsia, Negative for blurred vision.  Depression symptoms include none.  Tobacco screen: none  smoker.  Current meds include :   oral hypoglycemic include: Pioglitazone 30 mg, Glyburide-Metformin 5-500 mg, Rybelsus 7 mg  insulin/injectable : - .  Hypoglycemia severity is not applicable.he reports home blood glucose readings have been 132-125 (#s) and believes  having fair glucose control.  Most recent lab results include glycohemoglobin 7.4%, microalbuminuria have been -.  In regard to preventative care, his last ophthalmology exam was in less than 2 months ago.  Opthalmic evaluation have shown no pathology.  Concurrent relative health problems include HTN, hyperlipidemia.    Bobby Menesesis a 59 year old male presents with hypertension.  This was first diagnosed more than 15 years ago.  Current nonpharmacologic treatment includes low sodium diet, exercise, and meditation.  His current cardiac medication(s) regimen includes: Amlodipine 10 mg, Lisinopril-HCTZ 40-25 mg  .  He has not kept a blood pressure diary, but states that his blood pressures have been fair controll.  He is tolerating his medication(s)  well without side effects.  Compliance with treatment has been good.         HISTORY:  Past Medical History:    Diabetes (HCC)    Essential  hypertension    High blood pressure    High cholesterol    Hyperlipidemia      Past Surgical History:   Procedure Laterality Date    Colonoscopy screening - referral N/A 2023    Procedure: COLONOSCOPY-SCREENING;  Surgeon: Macario Barton MD;  Location: [facility] ENDOSCOPY      Family History   Problem Relation Age of Onset    Diabetes Father     Cancer Father 68        Bladder cancer    Diabetes Mother     Diabetes Sister     Diabetes Brother       Social History:   Social History     Socioeconomic History    Marital status:    Tobacco Use    Smoking status: Former     Current packs/day: 0.00     Types: Cigarettes     Quit date: 2000     Years since quittin.2    Smokeless tobacco: Never    Tobacco comments:     quit 16 years ago   Vaping Use    Vaping status: Never Used   Substance and Sexual Activity    Alcohol use: Yes     Comment: occ    Drug use: Never     Social Determinants of Health      Received from [facility], [facility]    Social Connections    Received from [facility], [facility]    Housing Stability        Immunizations:   Immunization History   Administered Date(s) Administered    Covid-19 Vaccine Pfizer 30 mcg/0.3 ml 2021, 2021    FLU VAC High Dose 65 YRS & Older PRSV Free (76387) 2019    FLULAVAL 6 months & older 0.5 ml Prefilled syringe ([id number]) 2020, 01/10/2022    FLUZONE 6 months and older PFS 0.5 ml ([id number]) 2017, 2018, 2019, 2020    Fluvirin, 3 Years & >, Im 10/17/2014    Fluzone Vaccine Medicare () 2012    HIGH DOSE FLU 65 YRS AND OLDER PRSV FREE SINGLE D (11387) FLU CLINIC 2019    Influenza 10/26/2015, 2016    Influenza Virus Vaccine, H1N1 01/10/2012    Pneumococcal (Prevnar 13) 2022    TDAP 2016       Medications (Active prior to today's visit):  Current Outpatient Medications   Medication Sig Dispense Refill     Glucose Blood (ONETOUCH VERIO) In Vitro Strip 1 strip by In Vitro route 2 (two) times daily. 100 strip 5    hydroCHLOROthiazide 25 MG Oral Tab Take 1 tablet (25 mg total) by mouth daily. 90 tablet 3    lisinopril 40 MG Oral Tab Take 1 tablet (40 mg total) by mouth daily. 90 tablet 3    Lancets (ONETOUCH DELICA PLUS [id number]) Does not apply Misc 1 each by In Vitro route 2 (two) times daily. 200 each 0    Semaglutide (RYBELSUS) 14 MG Oral Tab Take 14 mg by mouth daily. 90 tablet 1    Semaglutide (RYBELSUS) 14 MG Oral Tab Take 14 mg by mouth daily. 30 tablet 3    amLODIPine 10 MG Oral Tab Take 1 tablet (10 mg total) by mouth daily. 90 tablet 2    atorvastatin 40 MG Oral Tab Take 1 tablet (40 mg total) by mouth daily. 90 tablet 2    glyBURIDE-metFORMIN 5-500 MG Oral Tab Take 2 tablets by mouth 2 (two) times daily. 360 tablet 3    pioglitazone 30 MG Oral Tab Take 1 tablet (30 mg total) by mouth daily. 90 tablet 3    ergocalciferol 1.25 MG (93247 UT) Oral Cap TAKE ONE CAPSULE BY MOUTH ONCE MONTHLY 12 capsule 0    Sildenafil Citrate (VIAGRA) 100 MG Oral Tab Take 1 tablet (100 mg total) by mouth as needed for Erectile Dysfunction. (Patient not taking: Reported on 4/12/2024) 9 tablet 5       Allergies:  No Known Allergies      ROS:   Review of Systems   Constitutional:  Negative for appetite change, fatigue and fever.   Respiratory:  Negative for shortness of breath.    Cardiovascular:  Negative for chest pain.   Gastrointestinal:  Negative for abdominal pain.   Endocrine: Negative for polydipsia and polyuria.   Musculoskeletal:  Negative for myalgias.        Left upper thigh pain   Skin:  Negative for rash.   Neurological:  Negative for dizziness, weakness and headaches.       PHYSICAL EXAM:   VS: /78   Pulse 68   Ht 5' 11\" (1.803 m)   Wt 186 lb 6.4 oz (84.6 kg)   BMI 26.00 kg/m²     Physical Exam  Vitals reviewed.   Constitutional:       Appearance: Normal appearance. He is well-developed.   HENT:      Head:  Normocephalic.   Eyes:      General: No scleral icterus.     Conjunctiva/sclera: Conjunctivae normal.   Cardiovascular:      Rate and Rhythm: Normal rate.   Pulmonary:      Effort: Pulmonary effort is normal.   Musculoskeletal:      Cervical back: Neck supple.   Feet:      Right foot:      Protective Sensation: 10 sites tested.  10 sites sensed.      Left foot:      Protective Sensation: 10 sites tested.  10 sites sensed.   Lymphadenopathy:      Comments: UEs no edema   Skin:     Findings: No rash.   Psychiatric:         Mood and Affect: Mood normal.         LABORATORY RESULTS:   No results found for: \"URCOLOR\", \"URCLA\", \"URINELEUK\", \"URINENITRITE\", \"URINEBLOOD\"   Results for orders placed or performed in visit on 04/12/24   POC Glycohemoglobin [91980]   Result Value Ref Range    HEMOGLOBIN A1C 8.4 (A) 4.3 - 5.6 %    Cartridge Lot# [id number] Numeric    Cartridge Expiration Date 11/30/2025 Date       EKG / Spirometry : -     Radiology: No results found.     ASSESSMENT/PLAN:   Assessment   Encounter Diagnoses   Name Primary?    Uncontrolled type 2 diabetes mellitus with hyperglycemia (HCC) Yes    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, without long-term current use of insulin, unspecified laterality (HCC)     Essential hypertension        1. Uncontrolled type 2 diabetes mellitus with hyperglycemia (HCC)  2. 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, without long-term current use of insulin, unspecified laterality (HCC)    DIABETES A&P    LABORATORY & ORDERS: Blood test(s) ordered today ;   Orders Placed This Encounter   Procedures    POC Glycohemoglobin [38341]    Lipid Panel    Microalb/Creat Ratio, Random Urine    Comp Metabolic Panel (14)     Additional orders include:   MEDICATIONS:    Glucose Blood (ONETOUCH VERIO) In Vitro Strip, 1 strip by In Vitro route 2 (two) times daily., Disp: 100 strip, Rfl: 5    hydroCHLOROthiazide  25 MG Oral Tab, Take 1 tablet (25 mg total) by mouth daily., Disp: 90 tablet, Rfl: 3    lisinopril 40 MG Oral Tab, Take 1 tablet (40 mg total) by mouth daily., Disp: 90 tablet, Rfl: 3    Lancets (ONETOUCH DELICA PLUS [id number]) Does not apply Misc, 1 each by In Vitro route 2 (two) times daily., Disp: 200 each, Rfl: 0    Semaglutide (RYBELSUS) 14 MG Oral Tab, Take 14 mg by mouth daily., Disp: 90 tablet, Rfl: 1    Semaglutide (RYBELSUS) 14 MG Oral Tab, Take 14 mg by mouth daily., Disp: 30 tablet, Rfl: 3    amLODIPine 10 MG Oral Tab, Take 1 tablet (10 mg total) by mouth daily., Disp: 90 tablet, Rfl: 2    atorvastatin 40 MG Oral Tab, Take 1 tablet (40 mg total) by mouth daily., Disp: 90 tablet, Rfl: 2    glyBURIDE-metFORMIN 5-500 MG Oral Tab, Take 2 tablets by mouth 2 (two) times daily., Disp: 360 tablet, Rfl: 3    pioglitazone 30 MG Oral Tab, Take 1 tablet (30 mg total) by mouth daily., Disp: 90 tablet, Rfl: 3    ergocalciferol 1.25 MG (22611 UT) Oral Cap, TAKE ONE CAPSULE BY MOUTH ONCE MONTHLY, Disp: 12 capsule, Rfl: 0    Sildenafil Citrate (VIAGRA) 100 MG Oral Tab, Take 1 tablet (100 mg total) by mouth as needed for Erectile Dysfunction. (Patient not taking: Reported on 2024), Disp: 9 tablet, Rfl: 5.  Requested Prescriptions     Signed Prescriptions Disp Refills    Glucose Blood (ONETOUCH VERIO) In Vitro Strip 100 strip 5     Si strip by In Vitro route 2 (two) times daily.    hydroCHLOROthiazide 25 MG Oral Tab 90 tablet 3     Sig: Take 1 tablet (25 mg total) by mouth daily.    lisinopril 40 MG Oral Tab 90 tablet 3     Sig: Take 1 tablet (40 mg total) by mouth daily.    Lancets (ONETOUCH DELICA PLUS [id number]) Does not apply Misc 200 each 0     Si each by In Vitro route 2 (two) times daily.    Semaglutide (RYBELSUS) 14 MG Oral Tab 90 tablet 1     Sig: Take 14 mg by mouth daily.    Semaglutide (RYBELSUS) 14 MG Oral Tab 30 tablet 3     Sig: Take 14 mg by mouth daily.    amLODIPine 10 MG Oral Tab 90 tablet 2      Sig: Take 1 tablet (10 mg total) by mouth daily.    atorvastatin 40 MG Oral Tab 90 tablet 2     Sig: Take 1 tablet (40 mg total) by mouth daily.    glyBURIDE-metFORMIN 5-500 MG Oral Tab 360 tablet 3     Sig: Take 2 tablets by mouth 2 (two) times daily.    pioglitazone 30 MG Oral Tab 90 tablet 3     Sig: Take 1 tablet (30 mg total) by mouth daily.      REFERRALS:       Procedures    POC Glycohemoglobin [[id number]]    Lipid Panel    Microalb/Creat Ratio, Random Urine    Comp Metabolic Panel (14)     RECOMMENDATIONS: instructed in use of glucometer ( check fasting glucose multiple times a day), return for training in administering insulin injections, adherence to an 1800 calorie ADA diet,  5 pound weight loss, a graduated exercise program, HgbA1C level checked quarterly, daily foot self-inspection, need for yearly flu shots, and avoid all sodas, juices, candy, chocolates, cakes, ice cream, etc.      FOLLOW-UP: Schedule a follow-up visit in 6 weeks.       COUNSELING: The patient was counseled concerning the relationship between diabetes control and macrovascular disease including cardiovascular, cerebrovascular and peripheral vascular disease. The patient was counseled concerning the relationship between diabetes control and retinopathy, nephropathy, and neuropathy. Advised as to the targets of pre-meal glucoses ( mg/dl) and post meal glucoses (<140-160 mg/dl) Home glucose testing discussed. The A1c target of <7% according to ADA and <6.5% according to AACE were discussed.          3. Essential hypertension    MEDICATIONS:    hydroCHLOROthiazide 25 MG Oral Tab 90 tablet 3     Sig: Take 1 tablet (25 mg total) by mouth daily.    lisinopril 40 MG Oral Tab 90 tablet 3     Sig: Take 1 tablet (40 mg total) by mouth daily.   LABORATORY & ORDERS:   Orders Placed This Encounter   Procedures    POC Glycohemoglobin [[id number]]    Lipid Panel    Microalb/Creat Ratio, Random Urine    Comp Metabolic Panel (14)     RECOMMENDATIONS  given include: avoid pseudoephedrine or other stimulants/decongestants in common cold remedies, decrease consumption of alcohol, perform routine monitoring of blood pressure with home blood pressure cuff, exercise, reduction of dietary salt intake, take medication as prescribed, try not to miss doses, smoking cessation, weight loss, and stress reduction.    FOLLOW-UP: Schedule a follow-up visit in 6 weeks.               Orders This Visit:  Orders Placed This Encounter   Procedures    POC Glycohemoglobin [[id number]]    Lipid Panel    Microalb/Creat Ratio, Random Urine    Comp Metabolic Panel (14)       Meds This Visit:  Requested Prescriptions     Signed Prescriptions Disp Refills    Glucose Blood (ONETOUCH VERIO) In Vitro Strip 100 strip 5     Si strip by In Vitro route 2 (two) times daily.    hydroCHLOROthiazide 25 MG Oral Tab 90 tablet 3     Sig: Take 1 tablet (25 mg total) by mouth daily.    lisinopril 40 MG Oral Tab 90 tablet 3     Sig: Take 1 tablet (40 mg total) by mouth daily.    Lancets (ONETOUCH DELICA PLUS [id number]) Does not apply Misc 200 each 0     Si each by In Vitro route 2 (two) times daily.    Semaglutide (RYBELSUS) 14 MG Oral Tab 90 tablet 1     Sig: Take 14 mg by mouth daily.    Semaglutide (RYBELSUS) 14 MG Oral Tab 30 tablet 3     Sig: Take 14 mg by mouth daily.    amLODIPine 10 MG Oral Tab 90 tablet 2     Sig: Take 1 tablet (10 mg total) by mouth daily.    atorvastatin 40 MG Oral Tab 90 tablet 2     Sig: Take 1 tablet (40 mg total) by mouth daily.    glyBURIDE-metFORMIN 5-500 MG Oral Tab 360 tablet 3     Sig: Take 2 tablets by mouth 2 (two) times daily.    pioglitazone 30 MG Oral Tab 90 tablet 3     Sig: Take 1 tablet (30 mg total) by mouth daily.       Imaging & Referrals:  None         MICHELLE BETANCOURT MD

## 2024-05-28 ENCOUNTER — TELEPHONE (OUTPATIENT)
Dept: FAMILY MEDICINE CLINIC | Facility: CLINIC | Age: 59
End: 2024-05-28

## 2024-05-28 NOTE — TELEPHONE ENCOUNTER
Patient would like a physical appointment with . Next appointment is on 7/10/24. Patient said he was told by  to make an appointment around this time and that appointment he would see him for a physical. Please advise

## 2024-06-12 RX ORDER — HYDROCHLOROTHIAZIDE 25 MG/1
25 TABLET ORAL DAILY
Qty: 90 TABLET | Refills: 3 | OUTPATIENT
Start: 2024-06-12

## 2024-06-17 ENCOUNTER — OFFICE VISIT (OUTPATIENT)
Dept: FAMILY MEDICINE CLINIC | Facility: CLINIC | Age: 59
End: 2024-06-17

## 2024-06-17 ENCOUNTER — LAB ENCOUNTER (OUTPATIENT)
Dept: LAB | Age: 59
End: 2024-06-17
Attending: FAMILY MEDICINE

## 2024-06-17 VITALS
SYSTOLIC BLOOD PRESSURE: 132 MMHG | HEART RATE: 67 BPM | HEIGHT: 71 IN | DIASTOLIC BLOOD PRESSURE: 63 MMHG | WEIGHT: 178 LBS | BODY MASS INDEX: 24.92 KG/M2

## 2024-06-17 DIAGNOSIS — S76.912A MUSCLE STRAIN OF LEFT THIGH, INITIAL ENCOUNTER: ICD-10-CM

## 2024-06-17 DIAGNOSIS — E11.3299 TYPE 2 DIABETES MELLITUS WITH MILD NONPROLIFERATIVE RETINOPATHY WITHOUT MACULAR EDEMA, WITHOUT LONG-TERM CURRENT USE OF INSULIN, UNSPECIFIED LATERALITY (HCC): Primary | ICD-10-CM

## 2024-06-17 DIAGNOSIS — E11.3299 TYPE 2 DIABETES MELLITUS WITH MILD NONPROLIFERATIVE RETINOPATHY WITHOUT MACULAR EDEMA, WITHOUT LONG-TERM CURRENT USE OF INSULIN, UNSPECIFIED LATERALITY (HCC): ICD-10-CM

## 2024-06-17 DIAGNOSIS — E55.9 VITAMIN D DEFICIENCY: ICD-10-CM

## 2024-06-17 DIAGNOSIS — L60.8 TOENAIL DEFORMITY: ICD-10-CM

## 2024-06-17 DIAGNOSIS — Z87.898 HISTORY OF ELEVATED PSA: ICD-10-CM

## 2024-06-17 LAB
ALBUMIN SERPL-MCNC: 4.7 G/DL (ref 3.2–4.8)
ALBUMIN/GLOB SERPL: 1.8 {RATIO} (ref 1–2)
ALP LIVER SERPL-CCNC: 88 U/L
ALT SERPL-CCNC: 15 U/L
ANION GAP SERPL CALC-SCNC: 8 MMOL/L (ref 0–18)
AST SERPL-CCNC: 16 U/L (ref ?–34)
BILIRUB SERPL-MCNC: 0.5 MG/DL (ref 0.2–1.1)
BUN BLD-MCNC: 27 MG/DL (ref 9–23)
BUN/CREAT SERPL: 22 (ref 10–20)
CALCIUM BLD-MCNC: 10.1 MG/DL (ref 8.7–10.4)
CARTRIDGE EXPIRATION DATE: ABNORMAL DATE
CHLORIDE SERPL-SCNC: 107 MMOL/L (ref 98–112)
CHOLEST SERPL-MCNC: 147 MG/DL (ref ?–200)
CO2 SERPL-SCNC: 27 MMOL/L (ref 21–32)
CREAT BLD-MCNC: 1.23 MG/DL
CREAT UR-SCNC: 61.6 MG/DL
EGFRCR SERPLBLD CKD-EPI 2021: 67 ML/MIN/1.73M2 (ref 60–?)
FASTING PATIENT LIPID ANSWER: YES
FASTING STATUS PATIENT QL REPORTED: YES
GLOBULIN PLAS-MCNC: 2.6 G/DL (ref 2–3.5)
GLUCOSE BLD-MCNC: 230 MG/DL (ref 70–99)
HDLC SERPL-MCNC: 42 MG/DL (ref 40–59)
HEMOGLOBIN A1C: 7.4 % (ref 4.3–5.6)
LDLC SERPL CALC-MCNC: 81 MG/DL (ref ?–100)
MICROALBUMIN UR-MCNC: 2.5 MG/DL
MICROALBUMIN/CREAT 24H UR-RTO: 40.6 UG/MG (ref ?–30)
NONHDLC SERPL-MCNC: 105 MG/DL (ref ?–130)
OSMOLALITY SERPL CALC.SUM OF ELEC: 306 MOSM/KG (ref 275–295)
POTASSIUM SERPL-SCNC: 4.6 MMOL/L (ref 3.5–5.1)
PROT SERPL-MCNC: 7.3 G/DL (ref 5.7–8.2)
SODIUM SERPL-SCNC: 142 MMOL/L (ref 136–145)
TRIGL SERPL-MCNC: 133 MG/DL (ref 30–149)
VIT D+METAB SERPL-MCNC: 38.1 NG/ML (ref 30–100)
VLDLC SERPL CALC-MCNC: 21 MG/DL (ref 0–30)

## 2024-06-17 PROCEDURE — 80053 COMPREHEN METABOLIC PANEL: CPT

## 2024-06-17 PROCEDURE — 82570 ASSAY OF URINE CREATININE: CPT

## 2024-06-17 PROCEDURE — 84153 ASSAY OF PSA TOTAL: CPT | Performed by: FAMILY MEDICINE

## 2024-06-17 PROCEDURE — 36415 COLL VENOUS BLD VENIPUNCTURE: CPT

## 2024-06-17 PROCEDURE — 99214 OFFICE O/P EST MOD 30 MIN: CPT | Performed by: FAMILY MEDICINE

## 2024-06-17 PROCEDURE — 83036 HEMOGLOBIN GLYCOSYLATED A1C: CPT | Performed by: FAMILY MEDICINE

## 2024-06-17 PROCEDURE — 80061 LIPID PANEL: CPT

## 2024-06-17 PROCEDURE — 82043 UR ALBUMIN QUANTITATIVE: CPT

## 2024-06-17 PROCEDURE — 82306 VITAMIN D 25 HYDROXY: CPT

## 2024-06-17 RX ORDER — BACLOFEN 10 MG/1
10 TABLET ORAL 3 TIMES DAILY
Qty: 30 TABLET | Refills: 0 | Status: SHIPPED | OUTPATIENT
Start: 2024-06-17

## 2024-06-17 NOTE — PROGRESS NOTES
6/17/2024  11:49 AM    Bobby Meneses is a 59 year old male.    Chief complaint(s):   Chief Complaint   Patient presents with    Diabetes     F/u     Leg Pain     F/u left leg pt request PSA blood test      HPI:     Bobby Meneses primary complaint is regarding as above.     Patient Bobby Meneses is a 59 year old male is here to be evaluated for type 2 diabetes.  Specifically, male has type 2, insulin none requiring diabetes. Compliance with treatment has been poor.  Patient's diabetes was first diagnosed 15 years ago.  Patient follows general diet NOT following a 2000 calorie ADA diet.  Patient report experiencing the following diabetes related symptoms; Negative for polyuria, Negative for polydipsia, Negative for blurred vision.  Depression symptoms include none.  Tobacco screen: none  smoker.  Current meds include :   oral hypoglycemic include: Pioglitazone 30 mg, Glyburide-Metformin 5-500 mg, Rybelsus 7 mg  insulin/injectable : - .  Hypoglycemia severity is not applicable.he reports home blood glucose readings have been 132-125 (#s) and believes  having fair glucose control.  Most recent lab results include glycohemoglobin 7.4%, microalbuminuria have been -.  In regard to preventative care, his last ophthalmology exam was in less than 2 months ago.  Opthalmic evaluation have shown no pathology.  Concurrent relative health problems include HTN, hyperlipidemia. Also requesting referral to podiatrist for abnl toe nail.    Also request PSA blood test, due history of elevated   PSA. Request vit D levels checked.     In addition patient is complaining of having left upper inner thigh pain for the past few days.  Denies any trauma accidents or injuries.  Not associate with any swelling of the testicles or inguinal hernias.  Denies any trauma accidents or injuries.    HISTORY:  Past Medical History:    Diabetes (HCC)    Essential hypertension    High blood pressure    High cholesterol    Hyperlipidemia       Past Surgical History:   Procedure Laterality Date    Colonoscopy screening - referral N/A 2023    Procedure: COLONOSCOPY-SCREENING;  Surgeon: Macario Barton MD;  Location: [facility] ENDOSCOPY      Family History   Problem Relation Age of Onset    Diabetes Father     Cancer Father 68        Bladder cancer    Diabetes Mother     Diabetes Sister     Diabetes Brother       Social History:   Social History     Socioeconomic History    Marital status:    Tobacco Use    Smoking status: Former     Current packs/day: 0.00     Types: Cigarettes     Quit date: 2000     Years since quittin.4    Smokeless tobacco: Never    Tobacco comments:     quit 16 years ago   Vaping Use    Vaping status: Never Used   Substance and Sexual Activity    Alcohol use: Yes     Comment: occ    Drug use: Never     Social Determinants of Health      Received from [facility], [facility]    Social Connections    Received from [facility], [facility]    Housing Stability        Immunizations:   Immunization History   Administered Date(s) Administered    Covid-19 Vaccine Pfizer 30 mcg/0.3 ml 2021, 2021, 2021    Covid-19 Vaccine Pfizer Bryan-Sucrose 30 mcg/0.3 ml 04/15/2022    FLU VAC High Dose 65 YRS & Older PRSV Free (40625) 2019    FLULAVAL 6 months & older 0.5 ml Prefilled syringe (87723) 2020, 01/10/2022    FLUZONE 6 months and older PFS 0.5 ml (51445) 2017, 2018, 2019, 2020    Fluvirin, 3 Years & >, Im 10/17/2014    Fluzone Vaccine Medicare () 2012    HIGH DOSE FLU 65 YRS AND OLDER PRSV FREE SINGLE D (67237) FLU CLINIC 2019    Influenza 10/26/2015, 2016    Influenza Virus Vaccine, H1N1 01/10/2012    Moderna Covid-19 Vaccine 50mcg/0.5ml 12yrs+ (4345-5322) 2024    Pneumococcal (Prevnar 13) 2022    TDAP 2016    Zoster Vaccine Recombinant Adjuvanted  (Shingrix) 03/02/2023, 05/21/2023       Medications (Active prior to today's visit):  Current Outpatient Medications   Medication Sig Dispense Refill    baclofen 10 MG Oral Tab Take 1 tablet (10 mg total) by mouth 3 (three) times daily. 30 tablet 0    Glucose Blood (ONETOUCH VERIO) In Vitro Strip 1 strip by In Vitro route 2 (two) times daily. 100 strip 5    hydroCHLOROthiazide 25 MG Oral Tab Take 1 tablet (25 mg total) by mouth daily. 90 tablet 3    lisinopril 40 MG Oral Tab Take 1 tablet (40 mg total) by mouth daily. 90 tablet 3    Lancets (ONETOUCH DELICA PLUS [id number]) Does not apply Misc 1 each by In Vitro route 2 (two) times daily. 200 each 0    amLODIPine 10 MG Oral Tab Take 1 tablet (10 mg total) by mouth daily. 90 tablet 2    atorvastatin 40 MG Oral Tab Take 1 tablet (40 mg total) by mouth daily. 90 tablet 2    glyBURIDE-metFORMIN 5-500 MG Oral Tab Take 2 tablets by mouth 2 (two) times daily. 360 tablet 3    pioglitazone 30 MG Oral Tab Take 1 tablet (30 mg total) by mouth daily. 90 tablet 3    ergocalciferol 1.25 MG (93712 UT) Oral Cap TAKE ONE CAPSULE BY MOUTH ONCE MONTHLY 12 capsule 0    Semaglutide (RYBELSUS) 14 MG Oral Tab Take 14 mg by mouth daily. 90 tablet 1    Semaglutide (RYBELSUS) 14 MG Oral Tab Take 14 mg by mouth daily. 30 tablet 3    Sildenafil Citrate (VIAGRA) 100 MG Oral Tab Take 1 tablet (100 mg total) by mouth as needed for Erectile Dysfunction. (Patient not taking: Reported on 4/12/2024) 9 tablet 5       Allergies:  No Known Allergies      ROS:   Review of Systems   Constitutional:  Negative for appetite change, fatigue and fever.   Eyes:  Negative for visual disturbance.   Respiratory:  Negative for shortness of breath.    Cardiovascular:  Negative for chest pain.   Gastrointestinal:  Negative for abdominal pain.   Endocrine: Negative for polydipsia and polyuria.   Musculoskeletal:  Negative for myalgias.        Left, upper, inner thigh pain   Skin:  Negative for rash.   Neurological:   Negative for dizziness, weakness and headaches.       PHYSICAL EXAM:   VS: /63   Pulse 67   Ht 5' 11\" (1.803 m)   Wt 178 lb (80.7 kg)   BMI 24.83 kg/m²     Physical Exam  Vitals reviewed.   Constitutional:       Appearance: Normal appearance. He is well-developed.   HENT:      Head: Normocephalic.   Eyes:      General: No scleral icterus.     Conjunctiva/sclera: Conjunctivae normal.   Cardiovascular:      Rate and Rhythm: Normal rate.   Pulmonary:      Effort: Pulmonary effort is normal.   Musculoskeletal:      Cervical back: Neck supple.      Comments: Left upper thigh NT, no swelling and left hip with NFROM   Skin:     Findings: No rash.   Psychiatric:         Mood and Affect: Mood normal.         LABORATORY RESULTS:   No results found for: \"URCOLOR\", \"URCLA\", \"URINELEUK\", \"URINENITRITE\", \"URINEBLOOD\"   Results for orders placed or performed in visit on 06/17/24   POC Glycohemoglobin [42705]   Result Value Ref Range    HEMOGLOBIN A1C 7.4 (A) 4.3 - 5.6 %    Cartridge Lot# [id number] Numeric    Cartridge Expiration Date 21,226 Date       EKG / Spirometry : -     Radiology: No results found.     ASSESSMENT/PLAN:   Assessment   Encounter Diagnoses   Name Primary?    Uncontrolled type 2 diabetes mellitus with hyperglycemia (HCC) Yes    History of elevated PSA     Muscle strain of left thigh, initial encounter     Vitamin D deficiency     Toenail deformity      1. Controlled type 2 diabetes mellitus with hyperglycemia (HCC)    DIABETES A&P    LABORATORY & ORDERS: Blood test(s) ordered today ;   Orders Placed This Encounter   Procedures    Glucose Blood Test    POC Glycohemoglobin [20907]    PSA, Total W Reflex To Free    Vitamin D     Additional orders include:   MEDICATIONS:    baclofen 10 MG Oral Tab, Take 1 tablet (10 mg total) by mouth 3 (three) times daily., Disp: 30 tablet, Rfl: 0    Glucose Blood (ONETOUCH VERIO) In Vitro Strip, 1 strip by In Vitro route 2 (two) times daily., Disp: 100 strip, Rfl: 5     hydroCHLOROthiazide 25 MG Oral Tab, Take 1 tablet (25 mg total) by mouth daily., Disp: 90 tablet, Rfl: 3    lisinopril 40 MG Oral Tab, Take 1 tablet (40 mg total) by mouth daily., Disp: 90 tablet, Rfl: 3    Lancets (ONETOUCH DELICA PLUS [id number]) Does not apply Misc, 1 each by In Vitro route 2 (two) times daily., Disp: 200 each, Rfl: 0    amLODIPine 10 MG Oral Tab, Take 1 tablet (10 mg total) by mouth daily., Disp: 90 tablet, Rfl: 2    atorvastatin 40 MG Oral Tab, Take 1 tablet (40 mg total) by mouth daily., Disp: 90 tablet, Rfl: 2    glyBURIDE-metFORMIN 5-500 MG Oral Tab, Take 2 tablets by mouth 2 (two) times daily., Disp: 360 tablet, Rfl: 3    pioglitazone 30 MG Oral Tab, Take 1 tablet (30 mg total) by mouth daily., Disp: 90 tablet, Rfl: 3    ergocalciferol 1.25 MG (66947 UT) Oral Cap, TAKE ONE CAPSULE BY MOUTH ONCE MONTHLY, Disp: 12 capsule, Rfl: 0    Semaglutide (RYBELSUS) 14 MG Oral Tab, Take 14 mg by mouth daily., Disp: 90 tablet, Rfl: 1    Semaglutide (RYBELSUS) 14 MG Oral Tab, Take 14 mg by mouth daily., Disp: 30 tablet, Rfl: 3    Sildenafil Citrate (VIAGRA) 100 MG Oral Tab, Take 1 tablet (100 mg total) by mouth as needed for Erectile Dysfunction. (Patient not taking: Reported on 4/12/2024), Disp: 9 tablet, Rfl: 5.  Requested Prescriptions     Signed Prescriptions Disp Refills    baclofen 10 MG Oral Tab 30 tablet 0     Sig: Take 1 tablet (10 mg total) by mouth 3 (three) times daily.      REFERRALS:       Procedures    Glucose Blood Test    POC Glycohemoglobin [96711]    PSA, Total W Reflex To Free    Vitamin D    Podiatry Referral - [facility] ([place])     RECOMMENDATIONS: instructed in use of glucometer ( check fasting glucose daily), return for training in administering insulin injections, adherence to an 1800 calorie ADA diet,   a graduated exercise program, HgbA1C level checked quarterly, daily foot self-inspection, need for yearly flu shots, and avoid all sodas, juices, candy, chocolates, cakes,  ice cream, etc.      FOLLOW-UP: Schedule a follow-up visit in 6 months.       COUNSELING: The patient was counseled concerning the relationship between diabetes control and macrovascular disease including cardiovascular, cerebrovascular and peripheral vascular disease. The patient was counseled concerning the relationship between diabetes control and retinopathy, nephropathy, and neuropathy. Advised as to the targets of pre-meal glucoses ( mg/dl) and post meal glucoses (<140-160 mg/dl) Home glucose testing discussed. The A1c target of <7% according to ADA and <6.5% according to AACE were discussed.        2. History of elevated PSA    Lab:  PSA, Total W Reflex To Free    3. Muscle strain of left thigh, initial encounter    Stretching exercise  Rx:   - baclofen 10 MG Oral Tab; Take 1 tablet (10 mg total) by mouth 3 (three) times daily.  Dispense: 30 tablet; Refill: 0    4. Vitamin D deficiency    Lab: Vitamin D; Future    5. Toenail deformity    None fungal  Referral:  Podiatry Referral - [facility] ([place])          Orders This Visit:  Orders Placed This Encounter   Procedures    Glucose Blood Test    POC Glycohemoglobin [59555]    PSA, Total W Reflex To Free    Vitamin D       Meds This Visit:  Requested Prescriptions     Signed Prescriptions Disp Refills    baclofen 10 MG Oral Tab 30 tablet 0     Sig: Take 1 tablet (10 mg total) by mouth 3 (three) times daily.       Imaging & Referrals:  PODIATRY - INTERNAL         MICHELLE BETANCOURT MD

## 2024-06-18 ENCOUNTER — TELEPHONE (OUTPATIENT)
Dept: FAMILY MEDICINE CLINIC | Facility: CLINIC | Age: 59
End: 2024-06-18

## 2024-06-18 LAB — PSA SERPL-MCNC: 2.24 NG/ML (ref ?–4)

## 2024-06-18 NOTE — TELEPHONE ENCOUNTER
Patient called back for results    ID [id number] Jer     Patient contacted (name and date of birth verified). Provider's results and recommendations reviewed with patient. Patient verbalizes understanding of the information, agrees with plan of care and offers no further questions at this time.          Mikayla Ambrocio MA  6/18/2024  9:18 AM CDT Back to Top      Called no answer, left message for patient to return call.    Pasquale Darling MD  6/18/2024  8:23 AM CDT       Please call patient the following tests results were within jeanette limits; CMP, Lipids, TSH, UA +MICROALBUMIN. Normal vit D     Mikayla Ambrocio MA  6/18/2024  9:25 AM CDT Back to Top      Called no answer, left message for patient to return call.    Pasquale Darling MD  6/18/2024  8:25 AM CDT       Please call patient, the following results are within normal limits:  PSA.

## 2024-07-09 NOTE — TELEPHONE ENCOUNTER
Pt is requesting refill for the following medication        Lancets (ONETOUCH DELICA PLUS [id number]) Does not apply Misc, 1 each by In Vitro route 2 (two) times daily., Disp: 200 each, Rfl: 0

## 2024-07-12 RX ORDER — LANCETS 33 GAUGE
1 EACH MISCELLANEOUS 2 TIMES DAILY
Qty: 200 EACH | Refills: 3 | Status: SHIPPED | OUTPATIENT
Start: 2024-07-12

## 2024-07-12 NOTE — TELEPHONE ENCOUNTER
Refill Passed Per Protocol    Requested Prescriptions   Pending Prescriptions Disp Refills    Lancets (ONETOUCH DELICA PLUS [id number]) Does not apply Misc 200 each 0     Si each by In Vitro route 2 (two) times daily.       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, without long-term current use of insulin, unspecified laterality (HCC)    [facility]    Office Visit    3 months ago Uncontrolled type 2 diabetes mellitus with hyperglycemia [facility]    10 months ago Type 2 diabetes mellitus without complication, without long-term current use of insulin ([facility]    [facility] Visit    1 year ago Uncontrolled type 2 diabetes mellitus with hyperglycemia ([facility]    [facility] Visit    1 year ago Type 2 diabetes mellitus without complication, without long-term current use of insulin ([facility]          Future Appointments         Provider Department Appt Notes    In 2 weeks Elizabeth Wilcox DPM [facility]                          Future Appointments         Provider Department Appt Notes    In 2 weeks Elizabeth Wilcox DPM [facility]           Recent Outpatient Visits              3 weeks ago 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, without long-term  current use of insulin, unspecified laterality (HCC)    [facility] [place]    Office Visit    3 months ago Uncontrolled type 2 diabetes mellitus with hyperglycemia ([facility]    [facility]    Office Visit    10 months ago Type 2 diabetes mellitus without complication, without long-term current use of insulin (HCC)    [facility] [address], Pasquale Santoro MD    Office Visit    1 year ago Uncontrolled type 2 diabetes mellitus with hyperglycemia (HCC)    [facility] [address], Pasquale Santoro MD    Office Visit    1 year ago Type 2 diabetes mellitus without complication, without long-term current use of insulin (HCC)    [facility] [address], Pasquale Santoro MD    Office Visit

## 2024-07-29 ENCOUNTER — OFFICE VISIT (OUTPATIENT)
Dept: PODIATRY CLINIC | Facility: CLINIC | Age: 59
End: 2024-07-29

## 2024-07-29 DIAGNOSIS — B35.1 ONYCHOMYCOSIS: Primary | ICD-10-CM

## 2024-07-29 PROCEDURE — 99204 OFFICE O/P NEW MOD 45 MIN: CPT | Performed by: STUDENT IN AN ORGANIZED HEALTH CARE EDUCATION/TRAINING PROGRAM

## 2024-07-29 NOTE — PROGRESS NOTES
[facility] Podiatry  Progress Note      Bobby Meneses is a 59 year old male.   Chief Complaint   Patient presents with    Toe Pain     Consult- use  Martha ID# [id number]- bilateral hallux - nail is thick and discolored- denies injury- no pain- last A1c=7.4 on 6/17/2024- FBS this zy=[id number]             HPI:     Patient is a 59-year-old male presents to clinic today for bilateral great hallux nail dystrophy.  He admits that he had taken oral terbinafine multiple times in the past years which did not help his toenails.  Admits that the toenails are thickened discolored.      Allergies: Patient has no known allergies.    Current Outpatient Medications   Medication Sig Dispense Refill    Lancets (ONETOUCH DELICA PLUS [id number]) Does not apply Misc 1 each by In Vitro route 2 (two) times daily. 200 each 3    baclofen 10 MG Oral Tab Take 1 tablet (10 mg total) by mouth 3 (three) times daily. 30 tablet 0    Glucose Blood (ONETOUCH VERIO) In Vitro Strip 1 strip by In Vitro route 2 (two) times daily. 100 strip 5    hydroCHLOROthiazide 25 MG Oral Tab Take 1 tablet (25 mg total) by mouth daily. 90 tablet 3    lisinopril 40 MG Oral Tab Take 1 tablet (40 mg total) by mouth daily. 90 tablet 3    Semaglutide (RYBELSUS) 14 MG Oral Tab Take 14 mg by mouth daily. 90 tablet 1    Semaglutide (RYBELSUS) 14 MG Oral Tab Take 14 mg by mouth daily. 30 tablet 3    amLODIPine 10 MG Oral Tab Take 1 tablet (10 mg total) by mouth daily. 90 tablet 2    atorvastatin 40 MG Oral Tab Take 1 tablet (40 mg total) by mouth daily. 90 tablet 2    glyBURIDE-metFORMIN 5-500 MG Oral Tab Take 2 tablets by mouth 2 (two) times daily. 360 tablet 3    pioglitazone 30 MG Oral Tab Take 1 tablet (30 mg total) by mouth daily. 90 tablet 3    ergocalciferol 1.25 MG (69313 UT) Oral Cap TAKE ONE CAPSULE BY MOUTH ONCE MONTHLY 12 capsule 0    Sildenafil Citrate (VIAGRA) 100 MG Oral Tab Take 1 tablet (100 mg total) by mouth as needed for Erectile  Dysfunction. (Patient not taking: Reported on 2024) 9 tablet 5      Past Medical History:    Diabetes (HCC)    Essential hypertension    High blood pressure    High cholesterol    Hyperlipidemia      Past Surgical History:   Procedure Laterality Date    Colonoscopy screening - referral N/A 2023    Procedure: COLONOSCOPY-SCREENING;  Surgeon: Macario Barton MD;  Location: [facility] ENDOSCOPY      Family History   Problem Relation Age of Onset    Diabetes Father     Cancer Father 68        Bladder cancer    Diabetes Mother     Diabetes Sister     Diabetes Brother       Social History     Socioeconomic History    Marital status:    Tobacco Use    Smoking status: Former     Current packs/day: 0.00     Types: Cigarettes     Quit date:      Years since quittin.5    Smokeless tobacco: Never    Tobacco comments:     quit 16 years ago   Vaping Use    Vaping status: Never Used   Substance and Sexual Activity    Alcohol use: Yes     Comment: occ    Drug use: Never           REVIEW OF SYSTEMS:     Denies nause, fever, chills  No calf pain  Denies chest pain or SOB      EXAM:   There were no vitals taken for this visit.  GENERAL: well developed, well nourished, in no apparent distress  EXTREMITIES:   1. Integument: Normal skin temperature and turgor.  Bilateral hallux toenails are thickened and discolored with subungual debris with right worse than left  2. Vascular: Dorsalis pedis two out of four bilateral and posterior tibial pulses two out of   four bilateral, capillary refill normal.   3. Musculoskeletal: All muscle groups are graded 5 out of 5 in the foot and ankle.   4. Neurological: Normal sharp dull sensation; reflexes normal.             ASSESSMENT AND PLAN:   Diagnoses and all orders for this visit:    Onychomycosis        Plan:       Patient seen and examined and findings discussed with patient.  Discussed etiology of condition along with treatment options.  Informed patient that since  the oral treatment has failed and now recommend any further oral treatment at this time.  We did discuss nail avulsion with chemical matricectomy.  I would recommend this to be done to the right hallux toenail.  Advised patient to book a 20-minute procedure appointment to have this done.    The patient indicates understanding of these issues and agrees to the plan.        Elizabeth Wilcox DPM

## 2024-09-04 RX ORDER — LISINOPRIL 40 MG/1
40 TABLET ORAL DAILY
Qty: 90 TABLET | Refills: 3 | Status: SHIPPED | OUTPATIENT
Start: 2024-09-04

## 2024-09-05 NOTE — TELEPHONE ENCOUNTER
Refill Per Protocol     Requested Prescriptions   Pending Prescriptions Disp Refills    LISINOPRIL 40 MG Oral Tab [Pharmacy Med Name: LISINOPRIL 40MG TABLETS] 90 tablet 3     Sig: TAKE 1 TABLET(40 MG) BY MOUTH DAILY       Hypertension Medications Protocol Passed - 9/3/2024  7:10 AM        Passed - CMP or BMP in past 12 months        Passed - Last BP reading less than 140/90     BP Readings from Last 1 Encounters:   06/17/24 132/63               Passed - In person appointment or virtual visit in the past 12 mos or appointment in next 3 mos     Recent Outpatient Visits              1 month ago Onychomycosis    [facility], Elizabeth Hilton DPM    Office Visit    2 months ago 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, without long-term current use of insulin, unspecified laterality ([facility])    [facility]    Office Visit    4 months ago Uncontrolled type 2 diabetes mellitus with hyperglycemia ([facility])    [facility]    Office Visit    1 year ago Type 2 diabetes mellitus without complication, without long-term current use of insulin ([facility])    [facility]    Office Visit    1 year ago Uncontrolled type 2 diabetes mellitus with hyperglycemia ([facility])    [facility] [facility] [place]    Office Visit                      Passed - EGFRCR or GFRNAA > 50     GFR Evaluation  EGFRCR: 67 , resulted on 6/17/2024                   Recent Outpatient Visits              1 month ago [facility]omyc[facility]    [facility] Visit    2 months ago 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, without long-term current use of  insulin, unspecified laterality (HCC)    [facility] [address], Pasquale Santoro MD    Office Visit    4 months ago Uncontrolled type 2 diabetes mellitus with hyperglycemia ([facility])    [facility]    Office Visit    1 year ago Type 2 diabetes mellitus without complication, without long-term current use of insulin ([facility])    [facility], Pasquale Santoro MD    Office Visit    1 year ago Uncontrolled type 2 diabetes mellitus with hyperglycemia (HCC)    [facility] [address], Pasquale Santoro MD    Office Visit

## 2024-10-07 RX ORDER — GLYBURIDE-METFORMIN HYDROCHLORIDE 5; 500 MG/1; MG/1
2 TABLET ORAL 2 TIMES DAILY
Qty: 360 TABLET | Refills: 3 | OUTPATIENT
Start: 2024-10-07

## 2024-10-07 NOTE — TELEPHONE ENCOUNTER
Outpatient Medication Detail     Disp Refills Start End    glyBURIDE-metFORMIN 5-500 MG Oral Tab 360 tablet 3 4/12/2024 --    Sig - Route: Take 2 tablets by mouth 2 (two) times daily. - Oral    Sent to pharmacy as: glyBURIDE-metFORMIN 5-500 MG Oral Tablet (GLUCOVANCE)    E-Prescribing Status: Receipt confirmed by pharmacy (4/12/2024 11:22 AM CDT)      Pharmacy    [id number] - [place] - [address] [facility] [address] [place] [address], [phone], [phone]

## 2024-12-06 ENCOUNTER — OFFICE VISIT (OUTPATIENT)
Dept: FAMILY MEDICINE CLINIC | Facility: CLINIC | Age: 59
End: 2024-12-06

## 2024-12-06 VITALS
BODY MASS INDEX: 25.29 KG/M2 | DIASTOLIC BLOOD PRESSURE: 70 MMHG | WEIGHT: 180.63 LBS | HEIGHT: 71 IN | SYSTOLIC BLOOD PRESSURE: 130 MMHG | HEART RATE: 71 BPM

## 2024-12-06 DIAGNOSIS — E11.3299 TYPE 2 DIABETES MELLITUS WITH MILD NONPROLIFERATIVE RETINOPATHY WITHOUT MACULAR EDEMA, WITHOUT LONG-TERM CURRENT USE OF INSULIN, UNSPECIFIED LATERALITY (HCC): Primary | ICD-10-CM

## 2024-12-06 LAB — HEMOGLOBIN A1C: 7.2 % (ref 4.3–5.6)

## 2024-12-06 PROCEDURE — 99213 OFFICE O/P EST LOW 20 MIN: CPT | Performed by: FAMILY MEDICINE

## 2024-12-06 PROCEDURE — 83036 HEMOGLOBIN GLYCOSYLATED A1C: CPT | Performed by: FAMILY MEDICINE

## 2024-12-06 NOTE — PROGRESS NOTES
12/6/2024  12:21 PM    Bobby Meneses is a 59 year old male.    Chief complaint(s):   Chief Complaint   Patient presents with    Diabetes     F/u      HPI:     Bobby Meneses primary complaint is regarding DM.     Patient Bobby Meneses is a 59 year old male is here to be evaluated for type 2 diabetes.  Specifically, male has type 2, insulin none requiring diabetes. Compliance with treatment has been poor.  Patient's diabetes was first diagnosed 15 years ago.  Patient follows general diet NOT following a 2000 calorie ADA diet.  Patient report experiencing the following diabetes related symptoms; Negative for polyuria, Negative for polydipsia, Negative for blurred vision.  Depression symptoms include none.  Tobacco screen: none  smoker.  Current meds include :   oral hypoglycemic include: Pioglitazone 30 mg, Glyburide-Metformin 5-500 mg, Rybelsus 14 mg  insulin/injectable : - .  Hypoglycemia severity is not applicable.he reports home blood glucose readings have been 132-125 (#s) and believes  having fair glucose control.  Most recent lab results include glycohemoglobin 7.2%, microalbuminuria have been -.  In regard to preventative care, his last ophthalmology exam was in less than 2 months ago.  Opthalmic evaluation have shown no pathology.  Concurrent relative health problems include HTN, hyperlipidemia.     HISTORY:  Past Medical History:    Diabetes (HCC)    Essential hypertension    High blood pressure    High cholesterol    Hyperlipidemia      Past Surgical History:   Procedure Laterality Date    Colonoscopy screening - referral N/A 1/17/2023    Procedure: COLONOSCOPY-SCREENING;  Surgeon: Macario Barton MD;  Location: [facility] ENDOSCOPY      Family History   Problem Relation Age of Onset    Diabetes Father     Cancer Father 68        Bladder cancer    Diabetes Mother     Diabetes Sister     Diabetes Brother       Social History:   Social History     Socioeconomic History    Marital status:     Tobacco Use    Smoking status: Former     Current packs/day: 0.00     Types: Cigarettes     Quit date: 2000     Years since quittin.9    Smokeless tobacco: Never    Tobacco comments:     quit 16 years ago   Vaping Use    Vaping status: Never Used   Substance and Sexual Activity    Alcohol use: Yes     Comment: occ    Drug use: Never     Social Drivers of Health      [facility]    Social Connections    [facility]    Housing Stability        Immunizations:   Immunization History   Administered Date(s) Administered    Covid-19 Vaccine Pfizer 30 mcg/0.3 ml 2021, 2021, 2021    Covid-19 Vaccine Pfizer Bryan-Sucrose 30 mcg/0.3 ml 04/15/2022    FLU VAC High Dose 65 YRS & Older PRSV Free (75567) 2019    FLULAVAL 6 months & older 0.5 ml Prefilled syringe (09153) 2020, 01/10/2022    FLUZONE 6 months and older PFS 0.5 ml (84443) 2017, 2018, 2019, 2020    Fluvirin, 3 Years & >, Im 10/17/2014    Fluzone Vaccine Medicare () 2012    High Dose Fluzone Influenza Vaccine, 65yr+ PF 0.5mL (68271) 2019    Influenza 10/26/2015, 2016    Influenza Virus Vaccine, H1N1 01/10/2012    Moderna Covid-19 Vaccine 50mcg/0.5ml 12yrs+ 2024    Pneumococcal (Prevnar 13) 2022    TDAP 2016    Zoster Vaccine Recombinant Adjuvanted (Shingrix) 2023, 2023       Medications (Active prior to today's visit):  Current Outpatient Medications   Medication Sig Dispense Refill    lisinopril 40 MG Oral Tab Take 1 tablet (40 mg total) by mouth daily. 90 tablet 3    hydroCHLOROthiazide 25 MG Oral Tab Take 1 tablet (25 mg total) by mouth daily. 90 tablet 3    amLODIPine 10 MG Oral Tab Take 1 tablet (10 mg total) by mouth daily. 90 tablet 2    atorvastatin 40 MG Oral Tab Take 1 tablet (40 mg total) by mouth daily. 90 tablet  2    glyBURIDE-metFORMIN 5-500 MG Oral Tab Take 2 tablets by mouth 2 (two) times daily. 360 tablet 3    pioglitazone 30 MG Oral Tab Take 1 tablet (30 mg total) by mouth daily. 90 tablet 3    Lancets (ONETOUCH DELICA PLUS [id number]) Does not apply Misc 1 each by In Vitro route 2 (two) times daily. (Patient not taking: Reported on 12/6/2024) 200 each 3    baclofen 10 MG Oral Tab Take 1 tablet (10 mg total) by mouth 3 (three) times daily. (Patient not taking: Reported on 12/6/2024) 30 tablet 0    Glucose Blood (ONETOUCH VERIO) In Vitro Strip 1 strip by In Vitro route 2 (two) times daily. (Patient not taking: Reported on 12/6/2024) 100 strip 5    Semaglutide (RYBELSUS) 14 MG Oral Tab Take 14 mg by mouth daily. 30 tablet 3    ergocalciferol 1.25 MG (89273 UT) Oral Cap TAKE ONE CAPSULE BY MOUTH ONCE MONTHLY (Patient not taking: Reported on 12/6/2024) 12 capsule 0    Sildenafil Citrate (VIAGRA) 100 MG Oral Tab Take 1 tablet (100 mg total) by mouth as needed for Erectile Dysfunction. (Patient not taking: Reported on 12/6/2024) 9 tablet 5       Allergies:  Allergies[1]      ROS:   Review of Systems   Constitutional:  Negative for appetite change, fatigue and fever.   Eyes:  Negative for visual disturbance.   Respiratory:  Negative for shortness of breath.    Cardiovascular:  Negative for chest pain.   Gastrointestinal:  Negative for abdominal pain.   Endocrine: Negative for polydipsia and polyuria.   Musculoskeletal:  Negative for myalgias.   Skin:  Negative for rash.   Neurological:  Negative for dizziness, weakness and headaches.       PHYSICAL EXAM:   VS: /70   Pulse 71   Ht 5' 11\" (1.803 m)   Wt 180 lb 9.6 oz (81.9 kg)   BMI 25.19 kg/m²     Physical Exam  Vitals reviewed.   Constitutional:       Appearance: Normal appearance. He is well-developed.   HENT:      Head: Normocephalic.   Eyes:      General: No scleral icterus.     Conjunctiva/sclera: Conjunctivae normal.   Cardiovascular:      Rate and Rhythm:  Normal rate.   Pulmonary:      Effort: Pulmonary effort is normal.   Musculoskeletal:      Cervical back: Neck supple.   Skin:     Findings: No rash.   Psychiatric:         Mood and Affect: Mood normal.         LABORATORY RESULTS:   No results found for: \"URCOLOR\", \"URCLA\", \"URINELEUK\", \"URINENITRITE\", \"URINEBLOOD\"   Results for orders placed or performed in visit on 06/17/24   Lipid Panel    Collection Time: 06/17/24 12:52 PM   Result Value Ref Range    Cholesterol, Total 147 <200 mg/dL    HDL Cholesterol 42 40 - 59 mg/dL    Triglycerides 133 30 - 149 mg/dL    LDL Cholesterol 81 <100 mg/dL    VLDL 21 0 - 30 mg/dL    Non HDL Chol 105 <130 mg/dL    Patient Fasting for Lipid? Yes    Microalb/Creat Ratio, Random Urine    Collection Time: 06/17/24 12:52 PM   Result Value Ref Range    Microalbumin, Urine 2.50 mg/dL    Creatinine Ur Random 61.60 mg/dL    Malb/Cre Calc 40.6 (H) <=30.0 ug/mg   Comp Metabolic Panel (14)    Collection Time: 06/17/24 12:52 PM   Result Value Ref Range    Glucose 230 (H) 70 - 99 mg/dL    Sodium 142 136 - 145 mmol/L    Potassium 4.6 3.5 - 5.1 mmol/L    Chloride 107 98 - 112 mmol/L    CO2 27.0 21.0 - 32.0 mmol/L    Anion Gap 8 0 - 18 mmol/L    BUN 27 (H) 9 - 23 mg/dL    Creatinine 1.23 0.70 - 1.30 mg/dL    BUN/CREA Ratio 22.0 (H) 10.0 - 20.0    Calcium, Total 10.1 8.7 - 10.4 mg/dL    Calculated Osmolality 306 (H) 275 - 295 mOsm/kg    eGFR-Cr 67 >=60 mL/min/1.73m2    ALT 15 10 - 49 U/L    AST 16 <=34 U/L    Alkaline Phosphatase 88 45 - 117 U/L    Bilirubin, Total 0.5 0.2 - 1.1 mg/dL    Total Protein 7.3 5.7 - 8.2 g/dL    Albumin 4.7 3.2 - 4.8 g/dL    Globulin  2.6 2.0 - 3.5 g/dL    A/G Ratio 1.8 1.0 - 2.0    Patient Fasting for CMP? Yes    Vitamin D, 25-Hydroxy    Collection Time: 06/17/24 12:52 PM   Result Value Ref Range    Vitamin D, 25OH, Total 38.1 30.0 - 100.0 ng/mL     EKG / Spirometry : -     Radiology: No results found.     ASSESSMENT/PLAN:   Assessment   Encounter Diagnosis   Name  Primary?    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, without long-term current use of insulin, unspecified laterality (HCC) Yes       DIABETES A&P    LABORATORY & ORDERS: Blood test(s) ordered today ;   Orders Placed This Encounter   Procedures    POC Glycohemoglobin [27807]     Additional orders include: CPM  MEDICATIONS:    lisinopril 40 MG Oral Tab, Take 1 tablet (40 mg total) by mouth daily., Disp: 90 tablet, Rfl: 3    hydroCHLOROthiazide 25 MG Oral Tab, Take 1 tablet (25 mg total) by mouth daily., Disp: 90 tablet, Rfl: 3    amLODIPine 10 MG Oral Tab, Take 1 tablet (10 mg total) by mouth daily., Disp: 90 tablet, Rfl: 2    atorvastatin 40 MG Oral Tab, Take 1 tablet (40 mg total) by mouth daily., Disp: 90 tablet, Rfl: 2    glyBURIDE-metFORMIN 5-500 MG Oral Tab, Take 2 tablets by mouth 2 (two) times daily., Disp: 360 tablet, Rfl: 3    pioglitazone 30 MG Oral Tab, Take 1 tablet (30 mg total) by mouth daily., Disp: 90 tablet, Rfl: 3    Lancets (ONETOUCH DELICA PLUS [id number]) Does not apply Misc, 1 each by In Vitro route 2 (two) times daily. (Patient not taking: Reported on 12/6/2024), Disp: 200 each, Rfl: 3    baclofen 10 MG Oral Tab, Take 1 tablet (10 mg total) by mouth 3 (three) times daily. (Patient not taking: Reported on 12/6/2024), Disp: 30 tablet, Rfl: 0    Glucose Blood (ONETOUCH VERIO) In Vitro Strip, 1 strip by In Vitro route 2 (two) times daily. (Patient not taking: Reported on 12/6/2024), Disp: 100 strip, Rfl: 5    Semaglutide (RYBELSUS) 14 MG Oral Tab, Take 14 mg by mouth daily., Disp: 30 tablet, Rfl: 3    ergocalciferol 1.25 MG (35724 UT) Oral Cap, TAKE ONE CAPSULE BY MOUTH ONCE MONTHLY (Patient not taking: Reported on 12/6/2024), Disp: 12 capsule, Rfl: 0    Sildenafil Citrate (VIAGRA) 100 MG Oral Tab, Take 1 tablet (100 mg total) by mouth as needed for Erectile Dysfunction. (Patient not taking: Reported on 12/6/2024), Disp: 9 tablet, Rfl: 5.  Requested  Prescriptions      No prescriptions requested or ordered in this encounter      REFERRALS:       Procedures    POC Glycohemoglobin [[id number]]     RECOMMENDATIONS: instructed in use of glucometer ( check fasting glucose daily), return for training in administering insulin injections, adherence to an 1800 calorie ADA diet, a graduated exercise program, HgbA1C level checked quarterly, daily foot self-inspection, need for yearly flu shots, and avoid all sodas, juices, candy, chocolates, cakes, ice cream, etc.      FOLLOW-UP: Schedule a follow-up visit in 6 months.              Orders This Visit:  Orders Placed This Encounter   Procedures    POC Glycohemoglobin [[id number]]       Meds This Visit:  Requested Prescriptions      No prescriptions requested or ordered in this encounter       Imaging & Referrals:  None         MICHELLE BETANCOURT MD       [1] No Known Allergies

## 2025-01-08 RX ORDER — AMLODIPINE BESYLATE 10 MG/1
10 TABLET ORAL DAILY
Qty: 90 TABLET | Refills: 2 | Status: SHIPPED | OUTPATIENT
Start: 2025-01-08

## 2025-01-08 RX ORDER — ATORVASTATIN CALCIUM 40 MG/1
40 TABLET, FILM COATED ORAL DAILY
Qty: 90 TABLET | Refills: 2 | Status: SHIPPED | OUTPATIENT
Start: 2025-01-08

## 2025-01-08 NOTE — TELEPHONE ENCOUNTER
Refill passed per [facility] protocol.    Requested Prescriptions   Pending Prescriptions Disp Refills    ATORVASTATIN 40 MG Oral Tab [Pharmacy Med Name: ATORVASTATIN 40MG TABLETS] 90 tablet 2     Sig: TAKE 1 TABLET(40 MG) BY MOUTH DAILY       Cholesterol Medication Protocol Passed - 1/8/2025  4:14 PM        Passed - ALT < 80     Lab Results   Component Value Date    ALT 15 06/17/2024             Passed - ALT resulted within past year        Passed - Lipid panel within past 12 months     Lab Results   Component Value Date    CHOLEST 147 06/17/2024    TRIG 133 06/17/2024    HDL 42 06/17/2024    LDL 81 06/17/2024    VLDL 21 06/17/2024    NONHDLC 105 06/17/2024             Passed - In person appointment or virtual visit in the past 12 mos or appointment in next 3 mos     Recent Outpatient Visits              1 month ago 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, without long-term current use of insulin, unspecified laterality ([facility]    5 months ago Onychomycosis    [facility], Elizabeth Hilton DPM    Office Visit    6 months ago 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, without long-term current use of insulin, unspecified laterality ([facility]    9 months ago Uncontrolled type 2 diabetes mellitus with hyperglycemia ([facility]    1 year ago Type 2 diabetes mellitus without complication, without long-term current use of insulin ([facility]                      Passed - Medication is active on med list           AMLODIPINE 10 MG Oral Tab [Pharmacy Med Name: AMLODIPINE BESYLATE 10MG TABLETS] 90 tablet 2     Sig: TAKE 1 TABLET(10 MG) BY MOUTH DAILY       Hypertension Medications Protocol Passed - 1/8/2025  4:14 PM        Passed - CMP or BMP in past 12 months        Passed - Last BP reading less than 140/90     BP Readings from Last 1 Encounters:   12/06/24 130/70               Passed - In person appointment or virtual visit in the past 12 mos or appointment in next 3 mos     Recent Outpatient Visits              1 month ago 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, without long-term current use of insulin, unspecified laterality ([facility]    [facility] Visit    5 months ago [facility]    [facility] Visit    6 months ago 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, without long-term current use of insulin, unspecified laterality ([facility])    [facility]    Office Visit    9 months ago Uncontrolled type 2 diabetes mellitus with hyperglycemia ([facility])    [facility]    Office Visit    1 year ago Type 2 diabetes mellitus without complication, without long-term current use of insulin ([facility])    [facility]    Office Visit                      Passed - EGFRCR or GFRNAA > 50     GFR Evaluation  EGFRCR: 67 , resulted on 6/17/2024          Passed - Medication is active on med list             Recent Outpatient Visits              1 month ago 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, without long-term current use of insulin, unspecified laterality ([facility])    [facility]  [facility], Pasquale Santoro MD    Office Visit    5 months ago Onychomycosis    [facility], [facility], Elizabeth Hilton DPM    Office Visit    6 months ago 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, without long-term current use of insulin, unspecified laterality ([facility])    [facility], Pasquale Santoro MD    Office Visit    9 months ago Uncontrolled type 2 diabetes mellitus with hyperglycemia ([facility])    [facility], Pasquale Santoro MD    Office Visit    1 year ago Type 2 diabetes mellitus without complication, without long-term current use of insulin ([facility])    [facility], Pasquale Santoro MD    Office Visit

## 2025-01-30 ENCOUNTER — MED REC SCAN ONLY (OUTPATIENT)
Dept: FAMILY MEDICINE CLINIC | Facility: CLINIC | Age: 60
End: 2025-01-30

## 2025-04-24 ENCOUNTER — OFFICE VISIT (OUTPATIENT)
Facility: LOCATION | Age: 60
End: 2025-04-24

## 2025-04-24 VITALS — WEIGHT: 188.81 LBS | BODY MASS INDEX: 26.43 KG/M2 | HEIGHT: 71 IN

## 2025-04-24 DIAGNOSIS — H93.11 TINNITUS OF RIGHT EAR: Primary | ICD-10-CM

## 2025-04-24 DIAGNOSIS — H90.3 ASYMMETRIC SNHL (SENSORINEURAL HEARING LOSS): ICD-10-CM

## 2025-04-24 PROCEDURE — 92557 COMPREHENSIVE HEARING TEST: CPT | Performed by: AUDIOLOGIST

## 2025-04-24 PROCEDURE — 99214 OFFICE O/P EST MOD 30 MIN: CPT | Performed by: OTOLARYNGOLOGY

## 2025-04-24 PROCEDURE — 92567 TYMPANOMETRY: CPT | Performed by: AUDIOLOGIST

## 2025-04-24 NOTE — PROGRESS NOTES
The following individual(s) verbally consented to be recorded using ambient AI listening technology and understand that they can each withdraw their consent to this listening technology at any point by asking the clinician to turn off or pause the recording:  Yes to consent  Patient name: Bobby Meneses

## 2025-04-24 NOTE — PROGRESS NOTES
NEW PATIENT PROGRESS NOTE  OTOLOGY/OTOLARYNGOLOGY    REF MD:  No referring provider defined for this encounter.     PCP: MICHELLE BETANCOURT MD    CHIEF COMPLAINT:    Chief Complaint   Patient presents with    Ringing In Ear     Patient here for ringing in right ear x 2 years.      History of Present Illness  Bobby Meneses is a 60 year old male who presents with tinnitus in the right ear.    He experiences a persistent ringing sound in his right ear, described as tinnitus. The sound is particularly strong when he is going to sleep and upon waking, but it decreases in intensity thereafter. The tinnitus is localized to the right ear only.    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 He stopped working five years ago when the company closed, and the tinnitus began approximately two years ago. During his employment, annual hearing tests indicated some hearing loss, which he did not pass as normal.    He describes asymmetrical hearing loss, with the right ear having better low-frequency hearing but worse high-frequency hearing compared to the left ear. He has previously undergone an MRI for this issue, but he does not recall the details. He was told the test was normal and nothing could be done.    No history of headaches, neck pain, or jaw pain. He also mentions that he has never experienced headaches in his life.    He is currently concerned about his insurance coverage, which is set to  on May 1st, and is unsure about how to obtain new insurance.      PAST MEDICAL HISTORY:  Past Medical History[1]    PAST SURGICAL HISTORY:  Past Surgical History[2]    Medications Ordered Prior to Encounter[3]    Allergies: Allergies[4]    SOCIAL HISTORY:    Social History     Tobacco Use    Smoking status: Former     Current packs/day: 0.00     Types: Cigarettes     Quit date:      Years since quittin.3    Smokeless  tobacco: Never    Tobacco comments:     quit 16 years ago   Substance Use Topics    Alcohol use: Not Currently     Comment: occ       Family History[5]    REVIEW OF SYSTEMS:   PER HPI    EXAMINATION:  I washed my hands with an alcohol-based hand gel prior to examination  Constitutional:   --Vitals: Height 5' 11\" (1.803 m), weight 188 lb 12.8 oz (85.6 kg).  --General: no apparent distress, well-developed  Neuro: Facial movement normal bilateral  Respiratory: No stridor, stertor or increased work of breathing  ENT:  --Ear: The bilateral ears were examined under binocular microscopy  Right ear microscopic exam:  Pinna: Normal, no lesions or masses.  Mastoid: Nontender on palpation.   External auditory canal: Clear, no masses or lesions.  Tympanic membrane: Intact, no lesions, normal landmarks.  Middle ear: Aerated.    Left ear microscopic exam:  Pinna: Normal, no lesions or masses.  Mastoid: Nontender on palpation.   External auditory canal: Clear, no masses or lesions.  Tympanic membrane: Intact, no lesions, normal landmarks.  Middle ear: Aerated.    Latest Audiogram Result (Hz) Exam performed: 4/24/2025 11:36 AM Last edited by Hanna Linares, AUD on 4/24/2025 11:54 AM        125 250  1500 2000 3000 4000 6000 8000    Right air:  10 10  10 15 50 65 65  70    Left air:  25 30  30  25 60 60  60    Right mastoid bone:   5  10          Left mastoid bone:       25        Right mastoid bone (masked):       45  65      Left mastoid bone (masked):   30  25  25  45         Reliability:  Good    Transducer:  Headphones, Bone Oscillator, Inserts    Technique:  Conventional Audiometry    Comments:            Latest Speech Audiometry  Last edited by Hanna Linares, AUD on 4/24/2025 11:53 AM       Ear Method PTA SAT SRT [facility] Test/list Score (%) Intensity Mask/noise Notes    right live voice   15   Half-List 72 75  masked    left live voice   30   Half-List 88 75  masked                  Latest Tympanogram  Result       Probe Tone (Hz): 226 Exam performed: 4/24/2025 11:38 AM Last edited by Hanna Linares AUD on 4/24/2025 11:53 AM      Tympanograms  These were drawn by a user, not generated from device data      Right Ear Left Ear                     Right Ear Left Ear    Tympanogram type: Type A Type A    Canal volume (mL): 1.9 1.8    Peak pressure (daPa): -14 -7    Peak amplitude (mmho): 0.51 0.48    Tympanogram width (daPa):        Comments:                    Latest Audiogram and Tympanogram Result Text  Last edited by Hanna Linares AUD on 4/24/2025 12:02 PM      Addendum      HISTORY:    Bobby Meneses, 60 year old, was seen for a hearing assessment during an office visit with otolaryngologist Dylan Teran MD due to complaint of  right-sided tinnitus.   See ENT ROS intake form for detailed history.    RESULTS:    Tympanograms were consistent with normal pressure and compliance in both ears.      Pure tone air and bone conduction thresholds were consistent with normal hearing sensitivity through 1500 Hz, sloping to moderate to moderate-severe sensorineural hearing loss in the right ear and mild sensorineural hearing loss through 2000 Hz, sloping to moderate-severe mixed hearing loss in the left ear.     SRT:  Right: 15 dB HL            Left :  30 dB HL    WRS:  Right: 72% at 75 dB HL             Left: 88% at 75 dB HL    RECOMMENDATIONS:  1.  Follow-up with Dylan Teran MD.   2.  Repeat hearing assessment with perceived changes in hearing sensitivity or tinnitus or with onset of vertigo.  3.  Consider trial with amplification to address communication difficulties inherent with this degree of hearing loss.            Addended by Hanna Linares AUD on 4/24/2025 12:02 PM               ASSESSMENT/PLAN:  Bobby Meneses is a 60 year old male with     ICD-10-CM   1. Tinnitus of right ear  H93.11   2. Asymmetric SNHL (sensorineural hearing loss)  H90.3        Assessment &  Plan  Asymmetrical hearing loss  Asymmetrical hearing loss with low-frequency loss in the left ear and high-frequency loss in both ears, worse in the right ear, likely due to noise exposure and possible genetic factors. Hearing clarity worse in the right ear.   - Provide information for audiologist locations that dispense Medicaid hearing aids.  - Advise scheduling an appointment with a specialized audiologist before May 1st to utilize Medicaid benefits for hearing aids.  - Discuss the potential benefit of hearing aids in improving hearing and reducing tinnitus.    Right tinnitus - prior normal MRI IAC ww/o contrast per patient report. Records not available  -I discussed a staged approach to tinnitus treatment including hearing aids +/- dietary modifications (caffeine cessation, low salt diet), lifestyle hygiene (sleeping regularly, stress reduction), tinnitus masking techniques and treatment of conditions which can worsen the perception of tinnitus (headache, jaw clenching/TMJ, cervicalgia). Recommend hearing protection in noisy environments. All patient questions answered. Also discussed supplements which might be helpful including magnesium. Additional patient education also provided to the patient.    -Recommend yearly audiograms     Situation reviewed with the patient in detail.    Dylan Teran MD  Otology/Otolaryngology  [address] [place], [place] [address]  Phone [phone]  Fax [phone]          [1]   Past Medical History:   Diabetes (HCC)    Essential hypertension    High blood pressure    High cholesterol    Hyperlipidemia   [2]   Past Surgical History:  Procedure Laterality Date    Colonoscopy screening - referral N/A 1/17/2023    Procedure: COLONOSCOPY-SCREENING;  Surgeon: Macario Barton MD;  Location: [facility] ENDOSCOPY   [3]   Current Outpatient Medications on File Prior to Visit   Medication Sig Dispense Refill    atorvastatin 40  MG Oral Tab Take 1 tablet (40 mg total) by mouth daily. 90 tablet 2    amLODIPine 10 MG Oral Tab Take 1 tablet (10 mg total) by mouth daily. 90 tablet 2    lisinopril 40 MG Oral Tab Take 1 tablet (40 mg total) by mouth daily. 90 tablet 3    hydroCHLOROthiazide 25 MG Oral Tab Take 1 tablet (25 mg total) by mouth daily. 90 tablet 3    glyBURIDE-metFORMIN 5-500 MG Oral Tab Take 2 tablets by mouth 2 (two) times daily. 360 tablet 3    pioglitazone 30 MG Oral Tab Take 1 tablet (30 mg total) by mouth daily. 90 tablet 3    Lancets (ONETOUCH DELICA PLUS SYFGIZ74Y) Does not apply Misc 1 each by In Vitro route 2 (two) times daily. (Patient not taking: Reported on 4/24/2025) 200 each 3    baclofen 10 MG Oral Tab Take 1 tablet (10 mg total) by mouth 3 (three) times daily. (Patient not taking: Reported on 4/24/2025) 30 tablet 0    Glucose Blood (ONETOUCH VERIO) In Vitro Strip 1 strip by In Vitro route 2 (two) times daily. (Patient not taking: Reported on 4/24/2025) 100 strip 5    Semaglutide (RYBELSUS) 14 MG Oral Tab Take 14 mg by mouth daily. 30 tablet 3    ergocalciferol 1.25 MG (57367 UT) Oral Cap TAKE ONE CAPSULE BY MOUTH ONCE MONTHLY (Patient not taking: Reported on 4/24/2025) 12 capsule 0    Sildenafil Citrate (VIAGRA) 100 MG Oral Tab Take 1 tablet (100 mg total) by mouth as needed for Erectile Dysfunction. (Patient not taking: Reported on 4/24/2025) 9 tablet 5     No current facility-administered medications on file prior to visit.   [4] No Known Allergies  [5]   Family History  Problem Relation Age of Onset    Diabetes Father     Cancer Father 68        Bladder cancer    Diabetes Mother     Diabetes Sister     Diabetes Brother

## 2025-06-03 NOTE — TELEPHONE ENCOUNTER
Pharmacy requesting refill     hydroCHLOROthiazide 25 MG Oral Tab Take 1 tablet (25 mg total) by mouth daily. 90 tablet 3

## 2025-06-04 RX ORDER — HYDROCHLOROTHIAZIDE 25 MG/1
25 TABLET ORAL DAILY
Qty: 90 TABLET | Refills: 3 | Status: SHIPPED | OUTPATIENT
Start: 2025-06-04

## 2025-06-04 NOTE — TELEPHONE ENCOUNTER
Refill passed per [facility] protocols.    Requested Prescriptions   Pending Prescriptions Disp Refills    hydroCHLOROthiazide 25 MG Oral Tab 90 tablet 3     Sig: Take 1 tablet (25 mg total) by mouth daily.       Hypertension Medications Protocol Passed - 6/4/2025 11:40 AM

## 2025-07-22 NOTE — TELEPHONE ENCOUNTER
Refill Request:     pioglitazone 30 MG Oral Tab Take 1 tablet (30 mg total) by mouth daily. 90 tablet 3     Please advise

## 2025-07-25 RX ORDER — PIOGLITAZONE 30 MG/1
30 TABLET ORAL DAILY
Qty: 10 TABLET | Refills: 0 | Status: SHIPPED | OUTPATIENT
Start: 2025-07-25

## 2025-08-12 ENCOUNTER — OFFICE VISIT (OUTPATIENT)
Dept: FAMILY MEDICINE CLINIC | Facility: CLINIC | Age: 60
End: 2025-08-12

## 2025-08-12 VITALS
WEIGHT: 187 LBS | BODY MASS INDEX: 26.18 KG/M2 | DIASTOLIC BLOOD PRESSURE: 69 MMHG | HEART RATE: 60 BPM | SYSTOLIC BLOOD PRESSURE: 145 MMHG | HEIGHT: 71 IN

## 2025-08-12 DIAGNOSIS — M25.551 PAIN OF RIGHT HIP: ICD-10-CM

## 2025-08-12 DIAGNOSIS — E11.3299 TYPE 2 DIABETES MELLITUS WITH MILD NONPROLIFERATIVE RETINOPATHY WITHOUT MACULAR EDEMA, WITHOUT LONG-TERM CURRENT USE OF INSULIN, UNSPECIFIED LATERALITY (HCC): Primary | ICD-10-CM

## 2025-08-12 DIAGNOSIS — I10 ESSENTIAL HYPERTENSION: ICD-10-CM

## 2025-08-12 LAB — HEMOGLOBIN A1C: 8.8 % (ref 4.3–5.6)

## 2025-08-12 PROCEDURE — 83036 HEMOGLOBIN GLYCOSYLATED A1C: CPT | Performed by: FAMILY MEDICINE

## 2025-08-12 PROCEDURE — 99213 OFFICE O/P EST LOW 20 MIN: CPT | Performed by: FAMILY MEDICINE

## 2025-08-12 RX ORDER — ORAL SEMAGLUTIDE 14 MG/1
14 TABLET ORAL DAILY
Qty: 30 TABLET | Refills: 3 | Status: SHIPPED | OUTPATIENT
Start: 2025-08-12 | End: 2025-08-12

## 2025-08-12 RX ORDER — PIOGLITAZONE 30 MG/1
30 TABLET ORAL DAILY
Qty: 90 TABLET | Refills: 3 | Status: SHIPPED | OUTPATIENT
Start: 2025-08-12

## 2025-08-12 RX ORDER — HYDROCHLOROTHIAZIDE 25 MG/1
25 TABLET ORAL DAILY
Qty: 90 TABLET | Refills: 3 | Status: SHIPPED | OUTPATIENT
Start: 2025-08-12

## 2025-08-12 RX ORDER — GLYBURIDE-METFORMIN HYDROCHLORIDE 5; 500 MG/1; MG/1
2 TABLET ORAL 2 TIMES DAILY
Qty: 360 TABLET | Refills: 3 | Status: SHIPPED | OUTPATIENT
Start: 2025-08-12

## 2025-08-12 RX ORDER — LISINOPRIL 40 MG/1
40 TABLET ORAL DAILY
Qty: 90 TABLET | Refills: 3 | Status: SHIPPED | OUTPATIENT
Start: 2025-08-12

## 2025-08-12 RX ORDER — PIOGLITAZONE 30 MG/1
30 TABLET ORAL DAILY
Qty: 10 TABLET | Refills: 0 | Status: SHIPPED | OUTPATIENT
Start: 2025-08-12 | End: 2025-08-12

## 2025-08-12 RX ORDER — NAPROXEN 250 MG/1
250 TABLET ORAL 2 TIMES DAILY WITH MEALS
Qty: 60 TABLET | Refills: 1 | Status: SHIPPED | OUTPATIENT
Start: 2025-08-12

## 2025-08-12 RX ORDER — ORAL SEMAGLUTIDE 14 MG/1
14 TABLET ORAL DAILY
Qty: 90 TABLET | Refills: 3 | Status: SHIPPED | OUTPATIENT
Start: 2025-08-12 | End: 2025-09-11

## 2025-08-12 RX ORDER — AMLODIPINE BESYLATE 10 MG/1
10 TABLET ORAL DAILY
Qty: 90 TABLET | Refills: 2 | Status: SHIPPED | OUTPATIENT
Start: 2025-08-12

## 2025-08-12 RX ORDER — ATORVASTATIN CALCIUM 40 MG/1
40 TABLET, FILM COATED ORAL DAILY
Qty: 90 TABLET | Refills: 2 | Status: SHIPPED | OUTPATIENT
Start: 2025-08-12

## (undated) DEVICE — KIT CLEAN ENDOKIT 1.1OZ GOWNX2

## (undated) DEVICE — SNARE OPTMZ PLPCTM TRP

## (undated) DEVICE — Device: Brand: DUAL NARE NASAL CANNULAE FEMALE LUER CON 7FT O2 TUBE

## (undated) DEVICE — STERILE LATEX POWDER-FREE SURGICAL GLOVESWITH NITRILE COATING: Brand: PROTEXIS

## (undated) DEVICE — 60 ML SYRINGE REGULAR TIP: Brand: MONOJECT

## (undated) DEVICE — MEDI-VAC NON-CONDUCTIVE SUCTION TUBING 6MM X 1.8M (6FT.) L: Brand: CARDINAL HEALTH

## (undated) DEVICE — SNARE ENDOSCOPIC 10MM ROUND

## (undated) DEVICE — KIT ENDO ORCAPOD 160/180/190

## (undated) NOTE — LETTER
No referring provider defined for this encounter. 06/21/22        Patient: Dorys Kasper   YOB: 1965   Date of Visit: 6/21/2022       Dear  Dr. Garrett Sommers MD,      Thank you for referring Dorys Kasper to my practice. Please find my assessment and plan below. ASSESSMENT AND PLAN    1. Tinnitus, unspecified laterality  Right-sided tinnitus. Audiogram reveals symmetric mild downsloping to moderate sensorineural hearing loss normal tympanograms and speech discrimination scores. May be musculoskeletal as he does chew a lot of gum as well as nuts and other hard objects. Warm heat soft diet chewing both sides of mouth avoid gum peanuts and other firm objects and I will have him start meloxicam and cyclobenzaprine for a month. Return to see me in 4-week  - OP REFERRAL TO AUDIOLOGY                   Sincerely,   Rashid Delarosa MD   [address] [place] [address]  [address] [facility] [id number]    Document electronically generated by:  Pedrito Frausto.  Reema Delarosa MD